# Patient Record
Sex: MALE | Race: WHITE | NOT HISPANIC OR LATINO | Employment: FULL TIME | URBAN - METROPOLITAN AREA
[De-identification: names, ages, dates, MRNs, and addresses within clinical notes are randomized per-mention and may not be internally consistent; named-entity substitution may affect disease eponyms.]

---

## 2018-10-16 ENCOUNTER — OFFICE VISIT (OUTPATIENT)
Dept: FAMILY MEDICINE CLINIC | Facility: CLINIC | Age: 28
End: 2018-10-16

## 2018-10-16 VITALS
HEART RATE: 78 BPM | HEIGHT: 73 IN | SYSTOLIC BLOOD PRESSURE: 128 MMHG | OXYGEN SATURATION: 98 % | TEMPERATURE: 97.4 F | WEIGHT: 231.4 LBS | DIASTOLIC BLOOD PRESSURE: 88 MMHG | BODY MASS INDEX: 30.67 KG/M2

## 2018-10-16 DIAGNOSIS — K51.919 ULCERATIVE COLITIS WITH COMPLICATION, UNSPECIFIED LOCATION (HCC): ICD-10-CM

## 2018-10-16 DIAGNOSIS — K21.9 GASTROESOPHAGEAL REFLUX DISEASE WITHOUT ESOPHAGITIS: ICD-10-CM

## 2018-10-16 DIAGNOSIS — Z00.00 PREVENTATIVE HEALTH CARE: Primary | ICD-10-CM

## 2018-10-16 PROCEDURE — 90715 TDAP VACCINE 7 YRS/> IM: CPT | Performed by: INTERNAL MEDICINE

## 2018-10-16 PROCEDURE — 90686 IIV4 VACC NO PRSV 0.5 ML IM: CPT | Performed by: INTERNAL MEDICINE

## 2018-10-16 PROCEDURE — 90472 IMMUNIZATION ADMIN EACH ADD: CPT | Performed by: INTERNAL MEDICINE

## 2018-10-16 PROCEDURE — 90471 IMMUNIZATION ADMIN: CPT | Performed by: INTERNAL MEDICINE

## 2018-10-16 PROCEDURE — 99395 PREV VISIT EST AGE 18-39: CPT | Performed by: INTERNAL MEDICINE

## 2018-10-16 RX ORDER — AMOXICILLIN AND CLAVULANATE POTASSIUM 875; 125 MG/1; MG/1
TABLET, FILM COATED ORAL
COMMUNITY
Start: 2018-08-31 | End: 2018-10-16

## 2018-10-16 RX ORDER — ESOMEPRAZOLE MAGNESIUM 40 MG/1
40 CAPSULE, DELAYED RELEASE ORAL
COMMUNITY
End: 2023-01-31

## 2018-10-16 RX ORDER — CETIRIZINE HYDROCHLORIDE 10 MG/1
TABLET ORAL
COMMUNITY

## 2018-10-16 RX ORDER — PREDNISONE 10 MG/1
TABLET ORAL
COMMUNITY
Start: 2018-08-31 | End: 2018-10-16

## 2018-10-16 NOTE — PROGRESS NOTES
Chief Complaint:  [facility], prevention visit    HPI:  Sheldon Interiano is a 28 y.o. male who presents today to [facility]. He reports no complaints or issues at todays visit. He has a history of ulcerative colitis and GERD. UC well controlled on current medication. He follows with GI and has c-scopes every 2 years. GERD well controlled on nexium. History of borderline high blood pressure, checks his BP at home and reports pressures usually <130/80.     ROS:  Constitutional: no fevers, night sweats or unexplained weight loss  Eyes: no vision changes  ENT: no runny nose, ear pain, sore throat  Cardio: no chest pain, palpitations  Pulm: no shortness of breath, wheezing, or cough  GI: no abdominal pain or changes in bowel movements  : no difficulty urinating  MSK: no difficulty ambulating, no joint pain  Neuro: no weakness, dizziness or headache  Psych: no trouble sleeping  Endo: no change in appetite      Past Medical History:   Diagnosis Date   • Colitis    • GERD (gastroesophageal reflux disease)    • Vitiligo       Family History   Problem Relation Age of Onset   • Hypertension Paternal Grandfather    • Cancer Paternal Grandfather    • Cancer Mother    • Stroke Maternal Grandfather       Social History     Social History   • Marital status:      Spouse name: N/A   • Number of children: N/A   • Years of education: N/A     Occupational History   • Not on file.     Social History Main Topics   • Smoking status: Never Smoker   • Smokeless tobacco: Never Used   • Alcohol use 0.6 oz/week     1 Cans of beer per week   • Drug use: No   • Sexual activity: Defer     Other Topics Concern   • Not on file     Social History Narrative   • No narrative on file      No Known Allergies     There is no immunization history on file for this patient.     PE:  Vitals:    10/16/18 1439   BP: 128/88   Pulse: 78   Temp: 97.4 °F (36.3 °C)   SpO2: 98%        Gen Appearance: NAD  HEENT: Normocephalic, PERRLA, no thyromegaly,  trache midline  Heart: RRR, normal S1 and S2, no murmur  Lungs: CTA b/l, no wheezing, no crackles  Abdomen: Soft, non-tender, non-distended, no guarding and BSx4  MSK: Moves all extremities well, normal gait, no peripheral edema  Pulses: Palpable and equal b/l  Lymph nodes: No palpable lymphadenopathy   Neuro: No focal deficits      Current Outpatient Prescriptions   Medication Sig Dispense Refill   • balsalazide (COLAZAL) 750 MG capsule Take 2,250 mg by mouth 3 (Three) Times a Day.     • cetirizine (ZYRTEC ALLERGY) 10 MG tablet Zyrtec 10 mg tablet     • esomeprazole (nexIUM) 40 MG capsule Take 40 mg by mouth Every Morning Before Breakfast.       No current facility-administered medications for this visit.         Sheldon was seen today for establish care. Counseled on nutrition and exercise for blood pressure management and overall health. <2g salt in take and 120 minutes of aerobic exercise per week. Maintain healthy weight and avoid tobacco and alcohol abuse. Will be receiving tdap and flu shot today. C-scope q 2 years per GI for screening.    Diagnoses and all orders for this visit:    Preventative health care  -     Comprehensive Metabolic Panel; Future  -     Lipid Panel; Future  -     Hemoglobin A1c; Future      Ulcerative colitis with complication, unspecified location (CMS/HCC)  Follows with GI, well controlled on balsalazide. Has apt this Thursday for f/u.    Gastroesophageal reflux disease without esophagitis    Stable, well controlled on PPI.    Return in about 1 year (around 10/16/2019).

## 2019-02-05 ENCOUNTER — OFFICE VISIT (OUTPATIENT)
Dept: FAMILY MEDICINE CLINIC | Facility: CLINIC | Age: 29
End: 2019-02-05

## 2019-02-05 VITALS
DIASTOLIC BLOOD PRESSURE: 70 MMHG | HEIGHT: 73 IN | TEMPERATURE: 97.6 F | SYSTOLIC BLOOD PRESSURE: 124 MMHG | BODY MASS INDEX: 31.28 KG/M2 | OXYGEN SATURATION: 98 % | WEIGHT: 236 LBS | HEART RATE: 101 BPM

## 2019-02-05 DIAGNOSIS — J01.00 ACUTE NON-RECURRENT MAXILLARY SINUSITIS: Primary | ICD-10-CM

## 2019-02-05 DIAGNOSIS — J02.9 SORE THROAT: ICD-10-CM

## 2019-02-05 DIAGNOSIS — R50.9 FEVER, UNSPECIFIED FEVER CAUSE: ICD-10-CM

## 2019-02-05 DIAGNOSIS — R11.2 NAUSEA AND VOMITING, INTRACTABILITY OF VOMITING NOT SPECIFIED, UNSPECIFIED VOMITING TYPE: ICD-10-CM

## 2019-02-05 LAB
EXPIRATION DATE: NORMAL
EXPIRATION DATE: NORMAL
FLUAV AG NPH QL: NEGATIVE
FLUBV AG NPH QL: NEGATIVE
INTERNAL CONTROL: NORMAL
INTERNAL CONTROL: NORMAL
Lab: NORMAL
Lab: NORMAL
S PYO AG THROAT QL: NEGATIVE

## 2019-02-05 PROCEDURE — 99214 OFFICE O/P EST MOD 30 MIN: CPT | Performed by: INTERNAL MEDICINE

## 2019-02-05 PROCEDURE — 87880 STREP A ASSAY W/OPTIC: CPT | Performed by: INTERNAL MEDICINE

## 2019-02-05 PROCEDURE — 87804 INFLUENZA ASSAY W/OPTIC: CPT | Performed by: INTERNAL MEDICINE

## 2019-02-05 RX ORDER — AMOXICILLIN 875 MG/1
875 TABLET, COATED ORAL EVERY 12 HOURS SCHEDULED
Qty: 14 TABLET | Refills: 0 | Status: SHIPPED | OUTPATIENT
Start: 2019-02-05 | End: 2019-02-12

## 2019-02-05 RX ORDER — AMOXICILLIN AND CLAVULANATE POTASSIUM 875; 125 MG/1; MG/1
1 TABLET, FILM COATED ORAL 2 TIMES DAILY
COMMUNITY
End: 2019-02-05

## 2019-02-05 NOTE — PROGRESS NOTES
Chief Complaint:  Sinus congestion, cough, diarrhea, chills    HPI:  Sheldon Interiano is a 28 y.o. male who presents today for sinus congestion, purulent nasal discharge over the last 7 days. He had some chills over the weekend but no fever. He went to  over the weekend and was prscribed augmentin for sinus infection. He reports taking some ibuprofen along with augmentin and developed lower quadrant abdominal pain, chills and diarrhea. This felt like a ulcerative colitis flare to him so he stopped the augmentin and did not take anymore ibuprofen and his symptoms resolved. He continues to have sinus pressure and congestion.    ROS:  Constitutional: no fevers, night sweats or unexplained weight loss  Eyes: no vision changes  ENT: no runny nose, ear pain, sore throat  Cardio: no chest pain, palpitations  Pulm: no shortness of breath, wheezing, or cough  GI: no abdominal pain or changes in bowel movements  : no difficulty urinating  MSK: no difficulty ambulating, no joint pain  Neuro: no weakness, dizziness or headache  Psych: no trouble sleeping  Endo: no change in appetite      Past Medical History:   Diagnosis Date   • Colitis    • GERD (gastroesophageal reflux disease)    • Vitiligo       Family History   Problem Relation Age of Onset   • Hypertension Paternal Grandfather    • Cancer Paternal Grandfather    • Cancer Mother    • Stroke Maternal Grandfather       Social History     Socioeconomic History   • Marital status:      Spouse name: Not on file   • Number of children: Not on file   • Years of education: Not on file   • Highest education level: Not on file   Social Needs   • Financial resource strain: Not on file   • Food insecurity - worry: Not on file   • Food insecurity - inability: Not on file   • Transportation needs - medical: Not on file   • Transportation needs - non-medical: Not on file   Occupational History   • Not on file   Tobacco Use   • Smoking status: Never Smoker   • Smokeless tobacco:  Never Used   Substance and Sexual Activity   • Alcohol use: Yes     Alcohol/week: 0.6 oz     Types: 1 Cans of beer per week   • Drug use: No   • Sexual activity: Defer   Other Topics Concern   • Not on file   Social History Narrative   • Not on file      No Known Allergies   Immunization History   Administered Date(s) Administered   • FLUARIX/FLUZONE/AFLURIA/FLULAVAL QUAD 10/16/2018   • Tdap 10/16/2018        PE:  Vitals:    02/05/19 0955   BP: 124/70   Pulse: 101   Temp: 97.6 °F (36.4 °C)   SpO2: 98%        Gen Appearance: NAD  HEENT: Normocephalic, PERRLA, no thyromegaly, trache midline  Heart: RRR, normal S1 and S2, no murmur  Lungs: CTA b/l, no wheezing, no crackles  Abdomen: Soft, non-tender, non-distended, no guarding and BSx4  MSK: Moves all extremities well, normal gait, no peripheral edema  Pulses: Palpable and equal b/l  Lymph nodes: No palpable lymphadenopathy   Neuro: No focal deficits      Current Outpatient Medications   Medication Sig Dispense Refill   • balsalazide (COLAZAL) 750 MG capsule Take 2,250 mg by mouth 3 (Three) Times a Day.     • cetirizine (ZYRTEC ALLERGY) 10 MG tablet Zyrtec 10 mg tablet     • esomeprazole (nexIUM) 40 MG capsule Take 40 mg by mouth Every Morning Before Breakfast.     • amoxicillin (AMOXIL) 875 MG tablet Take 1 tablet by mouth Every 12 (Twelve) Hours for 7 days. 14 tablet 0     No current facility-administered medications for this visit.         Sheldon was seen today for medication reaction.    Diagnoses and all orders for this visit:    Acute non-recurrent maxillary sinusitis  -     amoxicillin (AMOXIL) 875 MG tablet; Take 1 tablet by mouth Every 12 (Twelve) Hours for 7 days.  Strep and flu negative. Recommend waiting a few more days to see if sinusitis resolves. Likely viral. Send amoxil instead of augmentin since he does not tolerate this, although I think the NSAIDs probably played a role in upset stomach as well. Try tylenol for fever/chills instead of ibuprofen  with concurrent IBS.   Sore throat  -     POC Influenza A / B  -     POCT rapid strep A    Nausea and vomiting, intractability of vomiting not specified, unspecified vomiting type  -     POC Influenza A / B    Fever, unspecified fever cause  -     POC Influenza A / B         No Follow-up on file.

## 2019-11-01 ENCOUNTER — OFFICE VISIT (OUTPATIENT)
Dept: FAMILY MEDICINE CLINIC | Facility: CLINIC | Age: 29
End: 2019-11-01

## 2019-11-01 VITALS
HEIGHT: 73 IN | WEIGHT: 244 LBS | HEART RATE: 72 BPM | BODY MASS INDEX: 32.34 KG/M2 | DIASTOLIC BLOOD PRESSURE: 78 MMHG | OXYGEN SATURATION: 100 % | RESPIRATION RATE: 16 BRPM | SYSTOLIC BLOOD PRESSURE: 122 MMHG

## 2019-11-01 DIAGNOSIS — Z23 IMMUNIZATION DUE: ICD-10-CM

## 2019-11-01 DIAGNOSIS — Z13.6 SCREENING FOR CARDIOVASCULAR CONDITION: ICD-10-CM

## 2019-11-01 DIAGNOSIS — K21.9 GASTROESOPHAGEAL REFLUX DISEASE WITHOUT ESOPHAGITIS: ICD-10-CM

## 2019-11-01 DIAGNOSIS — Z13.220 SCREENING FOR HYPERLIPIDEMIA: ICD-10-CM

## 2019-11-01 DIAGNOSIS — K51.919 ULCERATIVE COLITIS WITH COMPLICATION, UNSPECIFIED LOCATION (HCC): ICD-10-CM

## 2019-11-01 DIAGNOSIS — Z00.00 PREVENTATIVE HEALTH CARE: Primary | ICD-10-CM

## 2019-11-01 PROCEDURE — 99395 PREV VISIT EST AGE 18-39: CPT | Performed by: INTERNAL MEDICINE

## 2019-11-01 NOTE — PROGRESS NOTES
Chief Complaint:  physical    HPI:  Sheldon Interiano is a 29 y.o. male who presents today for yearly physical.  He has history of ulcerative colitis well-controlled on radha all.  He is following with gastroenterology.  He takes Zyrtec as needed for allergic rhinitis.  Taking Nexium for GERD.  He has no other acute complaints or concerns today.    ROS:  Constitutional: no fevers, night sweats or unexplained weight loss  Eyes: no vision changes  ENT: no runny nose, ear pain, sore throat  Cardio: no chest pain, palpitations  Pulm: no shortness of breath, wheezing, or cough  GI: no abdominal pain or changes in bowel movements  : no difficulty urinating  MSK: no difficulty ambulating, no joint pain  Neuro: no weakness, dizziness or headache  Psych: no trouble sleeping  Endo: no change in appetite      Past Medical History:   Diagnosis Date   • Colitis    • GERD (gastroesophageal reflux disease)    • Vitiligo       Family History   Problem Relation Age of Onset   • Hypertension Paternal Grandfather    • Cancer Paternal Grandfather    • Cancer Mother    • Stroke Maternal Grandfather       Social History     Socioeconomic History   • Marital status:      Spouse name: Not on file   • Number of children: Not on file   • Years of education: Not on file   • Highest education level: Not on file   Tobacco Use   • Smoking status: Never Smoker   • Smokeless tobacco: Never Used   Substance and Sexual Activity   • Alcohol use: Yes     Alcohol/week: 0.6 oz     Types: 1 Cans of beer per week   • Drug use: No   • Sexual activity: Defer      No Known Allergies   Immunization History   Administered Date(s) Administered   • FLUARIX/FLUZONE/AFLURIA/FLULAVAL QUAD 10/16/2018, 10/25/2019   • Tdap 10/16/2018        PE:  Vitals:    11/01/19 1249   BP: 122/78   Pulse: 72   Resp: 16   SpO2: 100%      Body mass index is 32.2 kg/m².    Gen Appearance: NAD  HEENT: Normocephalic, PERRLA, no thyromegaly, trache midline  Heart: RRR, normal S1  and S2, no murmur  Lungs: CTA b/l, no wheezing, no crackles  Abdomen: Soft, non-tender, non-distended, no guarding and BSx4  MSK: Moves all extremities well, normal gait, no peripheral edema  Pulses: Palpable and equal b/l  Lymph nodes: No palpable lymphadenopathy   Neuro: No focal deficits      Current Outpatient Medications   Medication Sig Dispense Refill   • balsalazide (COLAZAL) 750 MG capsule Take 2,250 mg by mouth 3 (Three) Times a Day.     • cetirizine (ZYRTEC ALLERGY) 10 MG tablet Zyrtec 10 mg tablet     • esomeprazole (nexIUM) 40 MG capsule Take 40 mg by mouth Every Morning Before Breakfast.       No current facility-administered medications for this visit.         Sheldon was seen today for annual exam.    Diagnoses and all orders for this visit:    Preventative health care  Counseled on healthy weight, nutrition, physical activity, cancer screening, immunization.  Checking screening blood work today.  Ulcerative colitis with complication, unspecified location (CMS/HCC)  -     CBC & Differential; Future  -     Comprehensive Metabolic Panel; Future  -     Lipid Panel; Future    Gastroesophageal reflux disease without esophagitis  Stable on Nexium.  Continue.  Screening for hyperlipidemia  -     CBC & Differential; Future  -     Comprehensive Metabolic Panel; Future  -     Hemoglobin A1c; Future  -     Lipid Panel; Future  -     Urinalysis With Culture If Indicated - Urine, Clean Catch; Future    Screening for cardiovascular condition  -     CBC & Differential; Future  -     Comprehensive Metabolic Panel; Future  -     Hemoglobin A1c; Future  -     Lipid Panel; Future  -     Urinalysis With Culture If Indicated - Urine, Clean Catch; Future         Return in about 1 year (around 11/1/2020) for Annual.     Please note that portions of this document were completed with a voice recognition program. Efforts were made to edit the dictations, but occasionally words are mis-transcribed.

## 2020-11-24 ENCOUNTER — OFFICE VISIT (OUTPATIENT)
Dept: FAMILY MEDICINE CLINIC | Facility: CLINIC | Age: 30
End: 2020-11-24

## 2020-11-24 VITALS
HEART RATE: 76 BPM | BODY MASS INDEX: 32.47 KG/M2 | DIASTOLIC BLOOD PRESSURE: 88 MMHG | WEIGHT: 245 LBS | TEMPERATURE: 98 F | SYSTOLIC BLOOD PRESSURE: 120 MMHG | HEIGHT: 73 IN | OXYGEN SATURATION: 96 % | RESPIRATION RATE: 16 BRPM

## 2020-11-24 DIAGNOSIS — J30.2 SEASONAL ALLERGIES: ICD-10-CM

## 2020-11-24 DIAGNOSIS — E66.09 CLASS 1 OBESITY DUE TO EXCESS CALORIES WITHOUT SERIOUS COMORBIDITY WITH BODY MASS INDEX (BMI) OF 32.0 TO 32.9 IN ADULT: ICD-10-CM

## 2020-11-24 DIAGNOSIS — K51.919 ULCERATIVE COLITIS WITH COMPLICATION, UNSPECIFIED LOCATION (HCC): ICD-10-CM

## 2020-11-24 DIAGNOSIS — K21.9 GASTROESOPHAGEAL REFLUX DISEASE WITHOUT ESOPHAGITIS: ICD-10-CM

## 2020-11-24 DIAGNOSIS — Z00.00 PREVENTATIVE HEALTH CARE: Primary | ICD-10-CM

## 2020-11-24 PROBLEM — E66.811 CLASS 1 OBESITY DUE TO EXCESS CALORIES WITHOUT SERIOUS COMORBIDITY WITH BODY MASS INDEX (BMI) OF 32.0 TO 32.9 IN ADULT: Status: ACTIVE | Noted: 2020-11-24

## 2020-11-24 PROCEDURE — 99395 PREV VISIT EST AGE 18-39: CPT | Performed by: INTERNAL MEDICINE

## 2020-11-24 PROCEDURE — 90686 IIV4 VACC NO PRSV 0.5 ML IM: CPT | Performed by: INTERNAL MEDICINE

## 2020-11-24 PROCEDURE — 90471 IMMUNIZATION ADMIN: CPT | Performed by: INTERNAL MEDICINE

## 2020-11-24 NOTE — PROGRESS NOTES
Chief Complaint   Patient presents with   • Annual Exam       HPI:  Sheldon Interiano is a 30 y.o. male who presents today for annual physical.  He has no acute concerns today.  Following gastroenterology.  We will start colitis is well controlled.  Taking Zyrtec for allergies daily.  He takes Nexium for reflux.    ROS:  Constitutional: no fevers, night sweats or unexplained weight loss  Eyes: no vision changes  ENT: no runny nose, ear pain, sore throat  Cardio: no chest pain, palpitations  Pulm: no shortness of breath, wheezing, or cough  GI: no abdominal pain or changes in bowel movements  : no difficulty urinating  MSK: no difficulty ambulating, no joint pain  Neuro: no weakness, dizziness or headache  Psych: no trouble sleeping  Endo: no change in appetite      Past Medical History:   Diagnosis Date   • Colitis    • GERD (gastroesophageal reflux disease)    • Vitiligo       Family History   Problem Relation Age of Onset   • Hypertension Paternal Grandfather    • Cancer Paternal Grandfather    • Cancer Mother    • Stroke Maternal Grandfather       Social History     Socioeconomic History   • Marital status:      Spouse name: Not on file   • Number of children: Not on file   • Years of education: Not on file   • Highest education level: Not on file   Tobacco Use   • Smoking status: Never Smoker   • Smokeless tobacco: Never Used   Substance and Sexual Activity   • Alcohol use: Yes     Alcohol/week: 1.0 standard drinks     Types: 1 Cans of beer per week   • Drug use: No   • Sexual activity: Defer      No Known Allergies   Immunization History   Administered Date(s) Administered   • Flulaval/Fluarix/Fluzone Quad 10/16/2018, 10/25/2019, 11/24/2020   • Tdap 10/16/2018        PE:  Vitals:    11/24/20 1229   BP: 120/88   Pulse: 76   Resp: 16   Temp: 98 °F (36.7 °C)   SpO2: 96%      Body mass index is 32.33 kg/m².    Gen Appearance: NAD  HEENT: Normocephalic, PERRLA, no thyromegaly, trache midline  Heart: RRR,  normal S1 and S2, no murmur  Lungs: CTA b/l, no wheezing, no crackles  Abdomen: Soft, non-tender, non-distended, no guarding and BSx4  MSK: Moves all extremities well, normal gait, no peripheral edema  Pulses: Palpable and equal b/l  Lymph nodes: No palpable lymphadenopathy   Neuro: No focal deficits      Current Outpatient Medications   Medication Sig Dispense Refill   • balsalazide (COLAZAL) 750 MG capsule Take 2,250 mg by mouth 3 (Three) Times a Day.     • cetirizine (ZYRTEC ALLERGY) 10 MG tablet Zyrtec 10 mg tablet     • esomeprazole (nexIUM) 40 MG capsule Take 40 mg by mouth Every Morning Before Breakfast.       No current facility-administered medications for this visit.         Diagnoses and all orders for this visit:    1. Preventative health care (Primary)  -     FluLaval Quad >6 Months (3432-6052)  Counseled on healthy weight, nutrition, physical activity, cancer screening, and immunizations.  Counseled patient on weight loss and obesity.    2. Gastroesophageal reflux disease without esophagitis  Stable on Nexium.  Continue.  3. Ulcerative colitis with complication, unspecified location (CMS/HCC)  Followed by gastroenterology.  Symptoms stable.  Seasonal allergies  Recommend alternating antihistamine therapy.  Add on Flonase daily.    Return in about 1 year (around 11/24/2021) for Annual.     Please note that portions of this document were completed with a voice recognition program. Efforts were made to edit the dictations, but occasionally words are mis-transcribed.

## 2020-11-24 NOTE — PATIENT INSTRUCTIONS
Try alternating between claritin, zyrtec and allegra for alleriges. Take nasal spray such as flonase daily. Xyzal is a stronger antihistamine but it tends to cause dry mouth and side effects. We could try this if allergies are severe.

## 2021-01-18 ENCOUNTER — LAB (OUTPATIENT)
Dept: LAB | Facility: HOSPITAL | Age: 31
End: 2021-01-18

## 2021-01-18 DIAGNOSIS — Z00.00 PREVENTATIVE HEALTH CARE: Primary | ICD-10-CM

## 2021-01-18 DIAGNOSIS — Z00.00 PREVENTATIVE HEALTH CARE: ICD-10-CM

## 2021-01-18 DIAGNOSIS — K51.919 MILD CHRONIC ULCERATIVE COLITIS WITH COMPLICATION (HCC): Primary | ICD-10-CM

## 2021-01-18 LAB
25(OH)D3 SERPL-MCNC: 27.1 NG/ML (ref 30–100)
ALBUMIN SERPL-MCNC: 4.6 G/DL (ref 3.5–5.2)
ALBUMIN/GLOB SERPL: 1.5 G/DL
ALP SERPL-CCNC: 99 U/L (ref 39–117)
ALT SERPL W P-5'-P-CCNC: 22 U/L (ref 1–41)
ANION GAP SERPL CALCULATED.3IONS-SCNC: 8.2 MMOL/L (ref 5–15)
AST SERPL-CCNC: 24 U/L (ref 1–40)
BASOPHILS # BLD AUTO: 0.08 10*3/MM3 (ref 0–0.2)
BASOPHILS NFR BLD AUTO: 1.2 % (ref 0–1.5)
BILIRUB SERPL-MCNC: 0.4 MG/DL (ref 0–1.2)
BILIRUB UR QL STRIP: NEGATIVE
BUN SERPL-MCNC: 13 MG/DL (ref 6–20)
BUN/CREAT SERPL: 14.8 (ref 7–25)
CALCIUM SPEC-SCNC: 9.2 MG/DL (ref 8.6–10.5)
CHLORIDE SERPL-SCNC: 105 MMOL/L (ref 98–107)
CHOLEST SERPL-MCNC: 145 MG/DL (ref 0–200)
CLARITY UR: ABNORMAL
CO2 SERPL-SCNC: 29.8 MMOL/L (ref 22–29)
COLOR UR: YELLOW
CREAT SERPL-MCNC: 0.88 MG/DL (ref 0.76–1.27)
CRP SERPL-MCNC: 0.16 MG/DL (ref 0–0.5)
DEPRECATED RDW RBC AUTO: 40.4 FL (ref 37–54)
EOSINOPHIL # BLD AUTO: 0.14 10*3/MM3 (ref 0–0.4)
EOSINOPHIL NFR BLD AUTO: 2.2 % (ref 0.3–6.2)
ERYTHROCYTE [DISTWIDTH] IN BLOOD BY AUTOMATED COUNT: 12.8 % (ref 12.3–15.4)
GFR SERPL CREATININE-BSD FRML MDRD: 100 ML/MIN/1.73
GLOBULIN UR ELPH-MCNC: 3 GM/DL
GLUCOSE SERPL-MCNC: 86 MG/DL (ref 65–99)
GLUCOSE UR STRIP-MCNC: NEGATIVE MG/DL
HBA1C MFR BLD: 5.81 % (ref 4.8–5.6)
HCT VFR BLD AUTO: 47.9 % (ref 37.5–51)
HDLC SERPL-MCNC: 45 MG/DL (ref 40–60)
HGB BLD-MCNC: 16.4 G/DL (ref 13–17.7)
HGB UR QL STRIP.AUTO: NEGATIVE
IMM GRANULOCYTES # BLD AUTO: 0.02 10*3/MM3 (ref 0–0.05)
IMM GRANULOCYTES NFR BLD AUTO: 0.3 % (ref 0–0.5)
KETONES UR QL STRIP: NEGATIVE
LDLC SERPL CALC-MCNC: 90 MG/DL (ref 0–100)
LDLC/HDLC SERPL: 2.02 {RATIO}
LEUKOCYTE ESTERASE UR QL STRIP.AUTO: NEGATIVE
LYMPHOCYTES # BLD AUTO: 1.92 10*3/MM3 (ref 0.7–3.1)
LYMPHOCYTES NFR BLD AUTO: 29.9 % (ref 19.6–45.3)
MCH RBC QN AUTO: 30.1 PG (ref 26.6–33)
MCHC RBC AUTO-ENTMCNC: 34.2 G/DL (ref 31.5–35.7)
MCV RBC AUTO: 88.1 FL (ref 79–97)
MONOCYTES # BLD AUTO: 0.58 10*3/MM3 (ref 0.1–0.9)
MONOCYTES NFR BLD AUTO: 9 % (ref 5–12)
NEUTROPHILS NFR BLD AUTO: 3.68 10*3/MM3 (ref 1.7–7)
NEUTROPHILS NFR BLD AUTO: 57.4 % (ref 42.7–76)
NITRITE UR QL STRIP: NEGATIVE
NRBC BLD AUTO-RTO: 0 /100 WBC (ref 0–0.2)
PH UR STRIP.AUTO: 8.5 [PH] (ref 5–8)
PLATELET # BLD AUTO: 304 10*3/MM3 (ref 140–450)
PMV BLD AUTO: 10 FL (ref 6–12)
POTASSIUM SERPL-SCNC: 4.9 MMOL/L (ref 3.5–5.2)
PROT SERPL-MCNC: 7.6 G/DL (ref 6–8.5)
PROT UR QL STRIP: ABNORMAL
RBC # BLD AUTO: 5.44 10*6/MM3 (ref 4.14–5.8)
SODIUM SERPL-SCNC: 143 MMOL/L (ref 136–145)
SP GR UR STRIP: 1.02 (ref 1–1.03)
T4 FREE SERPL-MCNC: 1.2 NG/DL (ref 0.93–1.7)
TRIGL SERPL-MCNC: 46 MG/DL (ref 0–150)
TSH SERPL DL<=0.05 MIU/L-ACNC: 1.11 UIU/ML (ref 0.27–4.2)
UROBILINOGEN UR QL STRIP: ABNORMAL
VLDLC SERPL-MCNC: 10 MG/DL (ref 5–40)
WBC # BLD AUTO: 6.42 10*3/MM3 (ref 3.4–10.8)

## 2021-01-18 PROCEDURE — 80053 COMPREHEN METABOLIC PANEL: CPT

## 2021-01-18 PROCEDURE — 82306 VITAMIN D 25 HYDROXY: CPT

## 2021-01-18 PROCEDURE — 86140 C-REACTIVE PROTEIN: CPT

## 2021-01-18 PROCEDURE — 80061 LIPID PANEL: CPT

## 2021-01-18 PROCEDURE — 84443 ASSAY THYROID STIM HORMONE: CPT

## 2021-01-18 PROCEDURE — 83036 HEMOGLOBIN GLYCOSYLATED A1C: CPT

## 2021-01-18 PROCEDURE — 36415 COLL VENOUS BLD VENIPUNCTURE: CPT

## 2021-01-18 PROCEDURE — 85025 COMPLETE CBC W/AUTO DIFF WBC: CPT

## 2021-01-18 PROCEDURE — 84439 ASSAY OF FREE THYROXINE: CPT

## 2021-01-18 PROCEDURE — 81003 URINALYSIS AUTO W/O SCOPE: CPT

## 2021-12-10 ENCOUNTER — OFFICE VISIT (OUTPATIENT)
Dept: FAMILY MEDICINE CLINIC | Facility: CLINIC | Age: 31
End: 2021-12-10

## 2021-12-10 VITALS
HEART RATE: 75 BPM | DIASTOLIC BLOOD PRESSURE: 84 MMHG | OXYGEN SATURATION: 98 % | WEIGHT: 226 LBS | BODY MASS INDEX: 29.95 KG/M2 | SYSTOLIC BLOOD PRESSURE: 130 MMHG | HEIGHT: 73 IN

## 2021-12-10 DIAGNOSIS — R73.03 PREDIABETES: ICD-10-CM

## 2021-12-10 DIAGNOSIS — K21.9 GASTROESOPHAGEAL REFLUX DISEASE WITHOUT ESOPHAGITIS: ICD-10-CM

## 2021-12-10 DIAGNOSIS — K51.919 ULCERATIVE COLITIS WITH COMPLICATION, UNSPECIFIED LOCATION (HCC): ICD-10-CM

## 2021-12-10 DIAGNOSIS — L84 FOOT CALLUS: ICD-10-CM

## 2021-12-10 DIAGNOSIS — E55.9 VITAMIN D DEFICIENCY: ICD-10-CM

## 2021-12-10 DIAGNOSIS — Z00.00 PREVENTATIVE HEALTH CARE: Primary | ICD-10-CM

## 2021-12-10 PROCEDURE — 99395 PREV VISIT EST AGE 18-39: CPT | Performed by: INTERNAL MEDICINE

## 2021-12-10 NOTE — PROGRESS NOTES
Chief Complaint   Patient presents with   • Annual Exam   • Foot Problem     Discuss podiatry referral       HPI:  Sheldon Interiano is a 31 y.o. male who presents today for annual physical.  Is requesting a referral to podiatry to evaluate calluses.    ROS:  Constitutional: no fevers, night sweats or unexplained weight loss  Eyes: no vision changes  ENT: no runny nose, ear pain, sore throat  Cardio: no chest pain, palpitations  Pulm: no shortness of breath, wheezing, or cough  GI: no abdominal pain or changes in bowel movements  : no difficulty urinating  MSK: no difficulty ambulating, no joint pain  Neuro: no weakness, dizziness or headache  Psych: no trouble sleeping  Endo: no change in appetite      Past Medical History:   Diagnosis Date   • Colitis    • GERD (gastroesophageal reflux disease)    • Vitiligo       Family History   Problem Relation Age of Onset   • Hypertension Paternal Grandfather    • Cancer Paternal Grandfather    • Cancer Mother    • Stroke Maternal Grandfather       Social History     Socioeconomic History   • Marital status:    Tobacco Use   • Smoking status: Never Smoker   • Smokeless tobacco: Never Used   Substance and Sexual Activity   • Alcohol use: Yes     Alcohol/week: 1.0 standard drink     Types: 1 Cans of beer per week   • Drug use: No   • Sexual activity: Defer      No Known Allergies   Immunization History   Administered Date(s) Administered   • COVID-19 (PFIZER) 03/09/2021, 04/01/2021, 11/07/2021   • FluLaval/Fluarix/Fluzone >6 10/16/2018, 10/25/2019, 11/24/2020   • Flublok 18+yrs 11/06/2021   • Influenza, Unspecified 09/19/2018   • Tdap 10/16/2018        PE:  Vitals:    12/10/21 1310   BP: 130/84   Pulse: 75   SpO2: 98%      Body mass index is 29.83 kg/m².    Gen Appearance: NAD  HEENT: Normocephalic, PERRLA, no thyromegaly, trache midline  Heart: RRR, normal S1 and S2, no murmur  Lungs: CTA b/l, no wheezing, no crackles  Abdomen: Soft, non-tender, non-distended, no  guarding and BSx4  MSK: Moves all extremities well, normal gait, no peripheral edema  Pulses: Palpable and equal b/l  Lymph nodes: No palpable lymphadenopathy   Neuro: No focal deficits      Current Outpatient Medications   Medication Sig Dispense Refill   • balsalazide (COLAZAL) 750 MG capsule Take 2,250 mg by mouth 3 (Three) Times a Day.     • cetirizine (ZYRTEC ALLERGY) 10 MG tablet Zyrtec 10 mg tablet     • esomeprazole (nexIUM) 40 MG capsule Take 40 mg by mouth Every Morning Before Breakfast.       No current facility-administered medications for this visit.        Diagnoses and all orders for this visit:    1. Preventative health care (Primary)  -     CBC & Differential; Future  -     Comprehensive Metabolic Panel; Future  -     Hemoglobin A1c; Future  -     Lipid Panel; Future  -     TSH+Free T4; Future  -     Urinalysis With Culture If Indicated - Urine, Clean Catch; Future  Counseled on healthy weight, nutrition, physical activity, cancer screening, and immunizations.    2. Foot callus  -     Ambulatory Referral to Podiatry    3. Prediabetes  Counseled on lifestyle changes including diet and exercise and weight loss.  4. Ulcerative colitis with complication, unspecified location (HCC)    5. Gastroesophageal reflux disease without esophagitis    6. Vitamin D deficiency  -     Vitamin D 25 Hydroxy; Future         No follow-ups on file.     Dictated Utilizing Dragon Dictation    Please note that portions of this note were completed with a voice recognition program.    Part of this note may be an electronic transcription/translation of spoken language to printed text using the Dragon Dictation System.

## 2022-06-09 ENCOUNTER — OFFICE VISIT (OUTPATIENT)
Dept: FAMILY MEDICINE CLINIC | Facility: CLINIC | Age: 32
End: 2022-06-09

## 2022-06-09 VITALS
SYSTOLIC BLOOD PRESSURE: 142 MMHG | DIASTOLIC BLOOD PRESSURE: 78 MMHG | BODY MASS INDEX: 29.58 KG/M2 | OXYGEN SATURATION: 97 % | HEIGHT: 73 IN | HEART RATE: 85 BPM | WEIGHT: 223.2 LBS

## 2022-06-09 DIAGNOSIS — J01.10 ACUTE NON-RECURRENT FRONTAL SINUSITIS: Primary | ICD-10-CM

## 2022-06-09 PROBLEM — I10 ESSENTIAL HYPERTENSION: Status: ACTIVE | Noted: 2022-06-09

## 2022-06-09 PROCEDURE — 99213 OFFICE O/P EST LOW 20 MIN: CPT | Performed by: INTERNAL MEDICINE

## 2022-06-09 RX ORDER — GUAIFENESIN, PSEUDOEPHEDRINE HYDROCHLORIDE 600; 60 MG/1; MG/1
1 TABLET, EXTENDED RELEASE ORAL EVERY 12 HOURS
Qty: 28 TABLET | Refills: 0 | Status: SHIPPED | OUTPATIENT
Start: 2022-06-09 | End: 2023-01-31

## 2022-06-09 RX ORDER — AZITHROMYCIN 250 MG/1
TABLET, FILM COATED ORAL
Qty: 6 TABLET | Refills: 0 | Status: SHIPPED | OUTPATIENT
Start: 2022-06-09 | End: 2023-01-31

## 2022-06-09 RX ORDER — FLUTICASONE PROPIONATE 50 MCG
2 SPRAY, SUSPENSION (ML) NASAL DAILY
Qty: 5 G | Refills: 0 | Status: SHIPPED | OUTPATIENT
Start: 2022-06-09

## 2022-06-09 NOTE — PROGRESS NOTES
"Sheldon Interiano  1990  [id number]  Patient Care Team:  Jose J Tello DO as PCP - General (Internal Medicine)    Sheldon Interiano is a 32 y.o. male here today for follow up.     This patient is accompanied by their self who contributes to the history of their care.    Chief Complaint:    Chief Complaint   Patient presents with   • Nasal Congestion     Sx started Sunday. Pt states got home from traveling last night and sinus pain was worse. Pt states took 2 rapid Covid tests and came back negative. Pt states took his PCR Covid test today and is currently waiting on results. Pt states no fever.     • Chills        History of Present Illness:  I have reviewed and/or updated the patient's past medical, past surgical, family, social history, problem list and allergies as appropriate.      Recent flight from [place], developed UR sx runny nose. Acute onset of sinus pressure. No cough. There is ear discomfort. Has not trried OTC medications. Using saline spray. Subjective fever and significant chills/myalgias.  There have been no wheezing.  Cough is occasionally productive of sputum.  Denies any chest pain.  He tells me has had difficulty with antibiotics in the past which was flared to his ulcerative colitis.  No current diarrhea.  No nausea or vomiting.    Review of Systems   Constitutional: Positive for fatigue and fever.   HENT: Positive for sinus pressure.    Eyes: Negative for photophobia.   Respiratory: Negative.    Cardiovascular: Negative.    Gastrointestinal: Negative.    Neurological: Positive for headache.       Vitals:    06/09/22 1555   BP: 142/78   Pulse: 85   SpO2: 97%   Weight: 101 kg (223 lb 3.2 oz)   Height: 185.4 cm (72.99\")   PainSc: 0-No pain     Body mass index is 29.45 kg/m².    Physical Exam  Vitals and nursing note reviewed.   Constitutional:       General: He is not in acute distress.     Appearance: He is well-developed. He is not diaphoretic.   HENT:      Head: Normocephalic and " atraumatic.      Right Ear: External ear normal.      Left Ear: External ear normal.      Mouth/Throat:      Pharynx: No oropharyngeal exudate.      Comments: Thick cloudy postnasal drainage.  Eyes:      General: No scleral icterus.        Right eye: No discharge.         Left eye: No discharge.      Conjunctiva/sclera: Conjunctivae normal.   Neck:      Thyroid: No thyromegaly.      Vascular: No JVD.      Trachea: No tracheal deviation.   Cardiovascular:      Rate and Rhythm: Normal rate and regular rhythm.      Heart sounds: Normal heart sounds.      Comments: PMI nondisplaced  Pulmonary:      Effort: Pulmonary effort is normal.      Breath sounds: Normal breath sounds. No wheezing or rales.   Abdominal:      General: Bowel sounds are normal.      Palpations: Abdomen is soft.      Tenderness: There is no abdominal tenderness. There is no guarding or rebound.   Musculoskeletal:      Cervical back: Normal range of motion and neck supple.      Comments: Normal gait   Lymphadenopathy:      Cervical: No cervical adenopathy.   Skin:     General: Skin is warm and dry.      Capillary Refill: Capillary refill takes less than 2 seconds.      Coloration: Skin is not pale.      Findings: No rash.   Neurological:      Mental Status: He is alert and oriented to person, place, and time.      Motor: No abnormal muscle tone.      Coordination: Coordination normal.   Psychiatric:         Judgment: Judgment normal.         Procedures    Results Review:    None    Assessment/Plan:     Problem List Items Addressed This Visit    None     Visit Diagnoses     Acute non-recurrent frontal sinusitis    -  Primary    [facility] pot, Mucinex D and Flonase. provided Rx  for azithromycin.  he Will  hold   this to see if symptoms progress or improve. He is to discuss abx with his GI      Have recommended KEfir 1/2 cup daily while on antibiotics if he chooses to initiate these    Plan of care reviewed with patient at the conclusion of today's visit.  Education was provided regarding diagnosis and management.  Patient verbalizes understanding of and agreement with management plan.    Return for Next scheduled follow up.    Clem Olivares MD      Please note than portions of this note were completed wt a Voice Recognition Program

## 2022-06-12 ENCOUNTER — TELEPHONE (OUTPATIENT)
Dept: FAMILY MEDICINE CLINIC | Facility: CLINIC | Age: 32
End: 2022-06-12

## 2022-06-12 NOTE — TELEPHONE ENCOUNTER
Received after hours message reporting patient tested positive for covid and has questions/concerns. Returned call but patient did not answer - left voicemail.

## 2023-01-31 ENCOUNTER — OFFICE VISIT (OUTPATIENT)
Dept: FAMILY MEDICINE CLINIC | Facility: CLINIC | Age: 33
End: 2023-01-31
Payer: COMMERCIAL

## 2023-01-31 VITALS
WEIGHT: 218 LBS | DIASTOLIC BLOOD PRESSURE: 94 MMHG | BODY MASS INDEX: 28.89 KG/M2 | HEART RATE: 86 BPM | SYSTOLIC BLOOD PRESSURE: 124 MMHG | OXYGEN SATURATION: 98 % | HEIGHT: 73 IN

## 2023-01-31 DIAGNOSIS — R73.03 PREDIABETES: ICD-10-CM

## 2023-01-31 DIAGNOSIS — K51.919 ULCERATIVE COLITIS WITH COMPLICATION, UNSPECIFIED LOCATION: ICD-10-CM

## 2023-01-31 DIAGNOSIS — Z11.59 ENCOUNTER FOR HEPATITIS C SCREENING TEST FOR LOW RISK PATIENT: ICD-10-CM

## 2023-01-31 DIAGNOSIS — R03.0 ELEVATED BLOOD PRESSURE READING: ICD-10-CM

## 2023-01-31 DIAGNOSIS — E55.9 VITAMIN D DEFICIENCY: ICD-10-CM

## 2023-01-31 DIAGNOSIS — Z00.00 PREVENTATIVE HEALTH CARE: Primary | ICD-10-CM

## 2023-01-31 PROCEDURE — 99395 PREV VISIT EST AGE 18-39: CPT | Performed by: INTERNAL MEDICINE

## 2023-01-31 NOTE — PROGRESS NOTES
Chief Complaint   Patient presents with   • Annual Exam   • Hypertension       F/u        HPI:  Sheldon Interiano is a 32 y.o. male who presents today for annual exam.    ROS:  Constitutional: no fevers, night sweats or unexplained weight loss  Eyes: no vision changes  ENT: no runny nose, ear pain, sore throat  Cardio: no chest pain, palpitations  Pulm: no shortness of breath, wheezing, or cough  GI: no abdominal pain or changes in bowel movements  : no difficulty urinating  MSK: no difficulty ambulating, no joint pain  Neuro: no weakness, dizziness or headache  Psych: no trouble sleeping  Endo: no change in appetite      Past Medical History:   Diagnosis Date   • Allergic     Seasonal   • Colitis    • GERD (gastroesophageal reflux disease)    • Inflammatory bowel disease     Have gastro doctor I see. Inactive currently   • Vitiligo       Family History   Problem Relation Age of Onset   • Hypertension Paternal Grandfather    • Cancer Paternal Grandfather          from colon cancer    • Cancer Mother    • Stroke Maternal Grandfather       Social History     Socioeconomic History   • Marital status:    Tobacco Use   • Smoking status: Never   • Smokeless tobacco: Never   Substance and Sexual Activity   • Alcohol use: Yes     Alcohol/week: 1.0 standard drink     Types: 1 Cans of beer per week   • Drug use: No   • Sexual activity: Yes     Partners: Female      No Known Allergies   Immunization History   Administered Date(s) Administered   • COVID-19 (PFIZER) BIVALENT BOOSTER 12+YRS 2022   • COVID-19 (PFIZER) PURPLE CAP 2021, 2021, 2021   • Flu Vaccine Quad PF 6-35MO 2022   • FluLaval/Fluzone >6mos 10/16/2018, 10/25/2019, 2020   • Flublok 18+yrs 2021   • Influenza, Unspecified 2018   • Tdap 10/16/2018        PE:  Vitals:    23 1235   BP: 124/94   Pulse: 86   SpO2: 98%      Body mass index is 28.77 kg/m².    Gen Appearance: NAD  HEENT: Normocephalic,  PERRLA, no thyromegaly, trache midline  Heart: RRR, normal S1 and S2, no murmur  Lungs: CTA b/l, no wheezing, no crackles  Abdomen: Soft, non-tender, non-distended, no guarding and BSx4  MSK: Moves all extremities well, normal gait, no peripheral edema  Pulses: Palpable and equal b/l  Lymph nodes: No palpable lymphadenopathy   Neuro: No focal deficits      Current Outpatient Medications   Medication Sig Dispense Refill   • balsalazide (COLAZAL) 750 MG capsule Take 2,250 mg by mouth 3 (Three) Times a Day.     • cetirizine (zyrTEC) 10 MG tablet Zyrtec 10 mg tablet     • fluticasone (Flonase) 50 MCG/ACT nasal spray 2 sprays into the nostril(s) as directed by provider Daily. 5 g 0     No current facility-administered medications for this visit.        Diagnoses and all orders for this visit:    1. Preventative health care (Primary)  -     CBC & Differential; Future  -     Comprehensive Metabolic Panel; Future  -     Hemoglobin A1c; Future  -     Lipid Panel; Future  -     TSH+Free T4; Future  -     Urinalysis With Culture If Indicated - Urine, Clean Catch; Future  Counseled on healthy weight, nutrition, physical activity, cancer screening, and immunizations.    2. Vitamin D deficiency  -     Vitamin D,25-Hydroxy; Future    3. Prediabetes    4. Ulcerative colitis with complication, unspecified location (HCC)    5. Elevated blood pressure reading    6. Encounter for hepatitis C screening test for low risk patient  -     Hepatitis C Antibody; Future         Return in about 1 year (around 1/31/2024) for Annual.     Dictated Utilizing Dragon Dictation    Please note that portions of this note were completed with a voice recognition program.    Part of this note may be an electronic transcription/translation of spoken language to printed text using the Dragon Dictation System.

## 2023-02-23 ENCOUNTER — LAB (OUTPATIENT)
Dept: LAB | Facility: HOSPITAL | Age: 33
End: 2023-02-23
Payer: COMMERCIAL

## 2023-02-23 DIAGNOSIS — E55.9 VITAMIN D DEFICIENCY: ICD-10-CM

## 2023-02-23 DIAGNOSIS — Z11.59 ENCOUNTER FOR HEPATITIS C SCREENING TEST FOR LOW RISK PATIENT: ICD-10-CM

## 2023-02-23 DIAGNOSIS — Z00.00 PREVENTATIVE HEALTH CARE: ICD-10-CM

## 2023-02-23 LAB
25(OH)D3 SERPL-MCNC: 25.1 NG/ML (ref 30–100)
ALBUMIN SERPL-MCNC: 4.3 G/DL (ref 3.5–5.2)
ALBUMIN/GLOB SERPL: 1.7 G/DL
ALP SERPL-CCNC: 73 U/L (ref 39–117)
ALT SERPL W P-5'-P-CCNC: 16 U/L (ref 1–41)
ANION GAP SERPL CALCULATED.3IONS-SCNC: 6 MMOL/L (ref 5–15)
AST SERPL-CCNC: 27 U/L (ref 1–40)
BASOPHILS # BLD AUTO: 0.08 10*3/MM3 (ref 0–0.2)
BASOPHILS NFR BLD AUTO: 1.5 % (ref 0–1.5)
BILIRUB SERPL-MCNC: 0.5 MG/DL (ref 0–1.2)
BILIRUB UR QL STRIP: NEGATIVE
BUN SERPL-MCNC: 15 MG/DL (ref 6–20)
BUN/CREAT SERPL: 16.5 (ref 7–25)
CALCIUM SPEC-SCNC: 9.4 MG/DL (ref 8.6–10.5)
CHLORIDE SERPL-SCNC: 103 MMOL/L (ref 98–107)
CHOLEST SERPL-MCNC: 136 MG/DL (ref 0–200)
CLARITY UR: CLEAR
CO2 SERPL-SCNC: 28 MMOL/L (ref 22–29)
COLOR UR: YELLOW
CREAT SERPL-MCNC: 0.91 MG/DL (ref 0.76–1.27)
DEPRECATED RDW RBC AUTO: 40.8 FL (ref 37–54)
EGFRCR SERPLBLD CKD-EPI 2021: 113.4 ML/MIN/1.73
EOSINOPHIL # BLD AUTO: 0.1 10*3/MM3 (ref 0–0.4)
EOSINOPHIL NFR BLD AUTO: 1.9 % (ref 0.3–6.2)
ERYTHROCYTE [DISTWIDTH] IN BLOOD BY AUTOMATED COUNT: 12.5 % (ref 12.3–15.4)
GLOBULIN UR ELPH-MCNC: 2.5 GM/DL
GLUCOSE SERPL-MCNC: 82 MG/DL (ref 65–99)
GLUCOSE UR STRIP-MCNC: NEGATIVE MG/DL
HBA1C MFR BLD: 5.6 % (ref 4.8–5.6)
HCT VFR BLD AUTO: 44.1 % (ref 37.5–51)
HCV AB SER DONR QL: NORMAL
HDLC SERPL-MCNC: 46 MG/DL (ref 40–60)
HGB BLD-MCNC: 15 G/DL (ref 13–17.7)
HGB UR QL STRIP.AUTO: NEGATIVE
IMM GRANULOCYTES # BLD AUTO: 0.01 10*3/MM3 (ref 0–0.05)
IMM GRANULOCYTES NFR BLD AUTO: 0.2 % (ref 0–0.5)
KETONES UR QL STRIP: ABNORMAL
LDLC SERPL CALC-MCNC: 82 MG/DL (ref 0–100)
LDLC/HDLC SERPL: 1.82 {RATIO}
LEUKOCYTE ESTERASE UR QL STRIP.AUTO: NEGATIVE
LYMPHOCYTES # BLD AUTO: 1.67 10*3/MM3 (ref 0.7–3.1)
LYMPHOCYTES NFR BLD AUTO: 31.2 % (ref 19.6–45.3)
MCH RBC QN AUTO: 30.4 PG (ref 26.6–33)
MCHC RBC AUTO-ENTMCNC: 34 G/DL (ref 31.5–35.7)
MCV RBC AUTO: 89.3 FL (ref 79–97)
MONOCYTES # BLD AUTO: 0.45 10*3/MM3 (ref 0.1–0.9)
MONOCYTES NFR BLD AUTO: 8.4 % (ref 5–12)
NEUTROPHILS NFR BLD AUTO: 3.05 10*3/MM3 (ref 1.7–7)
NEUTROPHILS NFR BLD AUTO: 56.8 % (ref 42.7–76)
NITRITE UR QL STRIP: NEGATIVE
NRBC BLD AUTO-RTO: 0 /100 WBC (ref 0–0.2)
PH UR STRIP.AUTO: 6.5 [PH] (ref 5–8)
PLATELET # BLD AUTO: 243 10*3/MM3 (ref 140–450)
PMV BLD AUTO: 10 FL (ref 6–12)
POTASSIUM SERPL-SCNC: 4.5 MMOL/L (ref 3.5–5.2)
PROT SERPL-MCNC: 6.8 G/DL (ref 6–8.5)
PROT UR QL STRIP: NEGATIVE
RBC # BLD AUTO: 4.94 10*6/MM3 (ref 4.14–5.8)
SODIUM SERPL-SCNC: 137 MMOL/L (ref 136–145)
SP GR UR STRIP: 1.02 (ref 1–1.03)
T4 FREE SERPL-MCNC: 1.32 NG/DL (ref 0.93–1.7)
TRIGL SERPL-MCNC: 31 MG/DL (ref 0–150)
TSH SERPL DL<=0.05 MIU/L-ACNC: 1.16 UIU/ML (ref 0.27–4.2)
UROBILINOGEN UR QL STRIP: ABNORMAL
VLDLC SERPL-MCNC: 8 MG/DL (ref 5–40)
WBC NRBC COR # BLD: 5.36 10*3/MM3 (ref 3.4–10.8)

## 2023-02-23 PROCEDURE — 82306 VITAMIN D 25 HYDROXY: CPT

## 2023-02-23 PROCEDURE — 80053 COMPREHEN METABOLIC PANEL: CPT

## 2023-02-23 PROCEDURE — 84443 ASSAY THYROID STIM HORMONE: CPT

## 2023-02-23 PROCEDURE — 80061 LIPID PANEL: CPT

## 2023-02-23 PROCEDURE — 81003 URINALYSIS AUTO W/O SCOPE: CPT

## 2023-02-23 PROCEDURE — 84439 ASSAY OF FREE THYROXINE: CPT

## 2023-02-23 PROCEDURE — 85025 COMPLETE CBC W/AUTO DIFF WBC: CPT

## 2023-02-23 PROCEDURE — 83036 HEMOGLOBIN GLYCOSYLATED A1C: CPT

## 2023-02-23 PROCEDURE — 86803 HEPATITIS C AB TEST: CPT

## 2023-03-20 ENCOUNTER — TELEPHONE (OUTPATIENT)
Dept: FAMILY MEDICINE CLINIC | Facility: CLINIC | Age: 33
End: 2023-03-20

## 2023-03-20 ENCOUNTER — OFFICE VISIT (OUTPATIENT)
Dept: FAMILY MEDICINE CLINIC | Facility: CLINIC | Age: 33
End: 2023-03-20
Payer: COMMERCIAL

## 2023-03-20 VITALS
DIASTOLIC BLOOD PRESSURE: 76 MMHG | WEIGHT: 218 LBS | HEIGHT: 73 IN | HEART RATE: 94 BPM | BODY MASS INDEX: 28.89 KG/M2 | SYSTOLIC BLOOD PRESSURE: 128 MMHG | OXYGEN SATURATION: 98 %

## 2023-03-20 DIAGNOSIS — N41.9 PROSTATITIS, UNSPECIFIED PROSTATITIS TYPE: Primary | ICD-10-CM

## 2023-03-20 DIAGNOSIS — R35.0 URINARY FREQUENCY: ICD-10-CM

## 2023-03-20 DIAGNOSIS — N41.9 PROSTATITIS, UNSPECIFIED PROSTATITIS TYPE: ICD-10-CM

## 2023-03-20 LAB
BILIRUB BLD-MCNC: NEGATIVE MG/DL
CLARITY, POC: ABNORMAL
COLOR UR: YELLOW
EXPIRATION DATE: ABNORMAL
GLUCOSE UR STRIP-MCNC: NEGATIVE MG/DL
KETONES UR QL: NEGATIVE
LEUKOCYTE EST, POC: NEGATIVE
Lab: ABNORMAL
NITRITE UR-MCNC: NEGATIVE MG/ML
PH UR: 6 [PH] (ref 5–8)
PROT UR STRIP-MCNC: ABNORMAL MG/DL
RBC # UR STRIP: NEGATIVE /UL
SP GR UR: 1.01 (ref 1–1.03)
UROBILINOGEN UR QL: NORMAL

## 2023-03-20 PROCEDURE — 81003 URINALYSIS AUTO W/O SCOPE: CPT | Performed by: INTERNAL MEDICINE

## 2023-03-20 PROCEDURE — 99214 OFFICE O/P EST MOD 30 MIN: CPT | Performed by: INTERNAL MEDICINE

## 2023-03-20 PROCEDURE — 87086 URINE CULTURE/COLONY COUNT: CPT | Performed by: INTERNAL MEDICINE

## 2023-03-20 RX ORDER — CIPROFLOXACIN 500 MG/1
500 TABLET, FILM COATED ORAL 2 TIMES DAILY
Qty: 20 TABLET | Refills: 0 | Status: SHIPPED | OUTPATIENT
Start: 2023-03-20 | End: 2023-03-30

## 2023-03-20 NOTE — PROGRESS NOTES
"Chief Complaint   Patient presents with   • Difficulty Urinating     Feeling \"fullness' in prostate with occasional pain. Urinary frequency x a few weeks, on and off.        HPI:  Sheldon Interiano is a 32 y.o. male who presents today for prostate pain and increased urinary frequency over the past few weeks.    ROS:  Constitutional: no fevers, night sweats or unexplained weight loss  Eyes: no vision changes  ENT: no runny nose, ear pain, sore throat  Cardio: no chest pain, palpitations  Pulm: no shortness of breath, wheezing, or cough  GI: no abdominal pain or changes in bowel movements  : no difficulty urinating  MSK: no difficulty ambulating, no joint pain  Neuro: no weakness, dizziness or headache  Psych: no trouble sleeping  Endo: no change in appetite      Past Medical History:   Diagnosis Date   • Allergic     Seasonal   • Colitis    • GERD (gastroesophageal reflux disease)    • Inflammatory bowel disease     Have gastro doctor I see. Inactive currently   • Vitiligo       Family History   Problem Relation Age of Onset   • Hypertension Paternal Grandfather    • Cancer Paternal Grandfather          from colon cancer    • Cancer Mother    • Stroke Maternal Grandfather       Social History     Socioeconomic History   • Marital status:    Tobacco Use   • Smoking status: Never   • Smokeless tobacco: Never   Substance and Sexual Activity   • Alcohol use: Yes     Alcohol/week: 1.0 standard drink     Types: 1 Cans of beer per week   • Drug use: No   • Sexual activity: Yes     Partners: Female      No Known Allergies   Immunization History   Administered Date(s) Administered   • COVID-19 (PFIZER) BIVALENT BOOSTER 12+YRS 2022   • COVID-19 (PFIZER) PURPLE CAP 2021, 2021, 2021   • Flu Vaccine Quad PF 6-35MO 2022   • FluLaval/Fluzone >6mos 10/16/2018, 10/25/2019, 2020   • Flublok 18+yrs 2021   • Influenza, Unspecified 2018   • Tdap 10/16/2018    "     PE:  Vitals:    03/20/23 0915   BP: 128/76   Pulse: 94   SpO2: 98%      Body mass index is 28.77 kg/m².    Gen Appearance: NAD  HEENT: Normocephalic, PERRLA, no thyromegaly, trache midline  Heart: RRR, normal S1 and S2, no murmur  Lungs: CTA b/l, no wheezing, no crackles  Abdomen: Soft, non-tender, non-distended, no guarding and BSx4  MSK: Moves all extremities well, normal gait, no peripheral edema  Pulses: Palpable and equal b/l  Lymph nodes: No palpable lymphadenopathy   Neuro: No focal deficits      Current Outpatient Medications   Medication Sig Dispense Refill   • balsalazide (COLAZAL) 750 MG capsule Take 3 capsules by mouth 3 (Three) Times a Day.     • cetirizine (zyrTEC) 10 MG tablet Zyrtec 10 mg tablet     • fluticasone (Flonase) 50 MCG/ACT nasal spray 2 sprays into the nostril(s) as directed by provider Daily. 5 g 0   • ciprofloxacin (Cipro) 500 MG tablet Take 1 tablet by mouth 2 (Two) Times a Day for 10 days. 20 tablet 0     No current facility-administered medications for this visit.      Sending urine for culture.  Recommend antibiotics for prostatitis and establish care with urology.    Diagnoses and all orders for this visit:    1. Prostatitis, unspecified prostatitis type (Primary)  -     ciprofloxacin (Cipro) 500 MG tablet; Take 1 tablet by mouth 2 (Two) Times a Day for 10 days.  Dispense: 20 tablet; Refill: 0  -     Ambulatory Referral to Urology  -     Urine Culture - Urine, Urine, Clean Catch; Future    2. Urinary frequency  -     POCT urinalysis dipstick, automated  -     ciprofloxacin (Cipro) 500 MG tablet; Take 1 tablet by mouth 2 (Two) Times a Day for 10 days.  Dispense: 20 tablet; Refill: 0  -     Ambulatory Referral to Urology  -     Urine Culture - Urine, Urine, Clean Catch; Future         No follow-ups on file.     Dictated Utilizing Dragon Dictation    Please note that portions of this note were completed with a voice recognition program.    Part of this note may be an electronic  transcription/translation of spoken language to printed text using the Dragon Dictation System.

## 2023-03-20 NOTE — TELEPHONE ENCOUNTER
Caller: Sheldon Interiano    Relationship: Self    Best call back number: [phone]    What medications are you currently taking:   Current Outpatient Medications on File Prior to Visit   Medication Sig Dispense Refill   • balsalazide (COLAZAL) 750 MG capsule Take 3 capsules by mouth 3 (Three) Times a Day.     • cetirizine (zyrTEC) 10 MG tablet Zyrtec 10 mg tablet     • ciprofloxacin (Cipro) 500 MG tablet Take 1 tablet by mouth 2 (Two) Times a Day for 10 days. 20 tablet 0   • fluticasone (Flonase) 50 MCG/ACT nasal spray 2 sprays into the nostril(s) as directed by provider Daily. 5 g 0     No current facility-administered medications on file prior to visit.          Which medication are you concerned about: ciprofloxacin (Cipro) 500 MG tablet- PRESCRIBED TODAY      What are your concerns: PATIENT WANTS TO KNOW IF THIS MEDICATION CAN TRIGGER HIS ULCERATIVE COLITIS. REQUEST CALLBACK

## 2023-03-21 LAB — BACTERIA SPEC AEROBE CULT: NO GROWTH

## 2023-04-10 ENCOUNTER — HOSPITAL ENCOUNTER (OUTPATIENT)
Dept: ULTRASOUND IMAGING | Facility: HOSPITAL | Age: 33
Discharge: HOME | End: 2023-04-10
Admitting: INTERNAL MEDICINE
Payer: COMMERCIAL

## 2023-04-10 DIAGNOSIS — N41.9 PROSTATITIS, UNSPECIFIED PROSTATITIS TYPE: ICD-10-CM

## 2023-04-10 PROCEDURE — 76870 US EXAM SCROTUM: CPT

## 2023-04-19 ENCOUNTER — OFFICE VISIT (OUTPATIENT)
Dept: UROLOGY | Facility: CLINIC | Age: 33
End: 2023-04-19
Payer: COMMERCIAL

## 2023-04-19 VITALS — BODY MASS INDEX: 28.77 KG/M2 | HEIGHT: 73 IN

## 2023-04-19 DIAGNOSIS — N41.1 CHRONIC PROSTATITIS: ICD-10-CM

## 2023-04-19 DIAGNOSIS — R30.0 DYSURIA: ICD-10-CM

## 2023-04-19 DIAGNOSIS — N50.811 PAIN IN BOTH TESTICLES: ICD-10-CM

## 2023-04-19 DIAGNOSIS — R35.0 URINARY FREQUENCY: Primary | ICD-10-CM

## 2023-04-19 DIAGNOSIS — N50.812 PAIN IN BOTH TESTICLES: ICD-10-CM

## 2023-04-19 LAB
BILIRUB BLD-MCNC: NEGATIVE MG/DL
CLARITY, POC: CLEAR
COLOR UR: YELLOW
EXPIRATION DATE: ABNORMAL
GLUCOSE UR STRIP-MCNC: NEGATIVE MG/DL
KETONES UR QL: NEGATIVE
LEUKOCYTE EST, POC: NEGATIVE
Lab: ABNORMAL
NITRITE UR-MCNC: NEGATIVE MG/ML
PH UR: 8.5 [PH] (ref 5–8)
PROT UR STRIP-MCNC: NEGATIVE MG/DL
RBC # UR STRIP: NEGATIVE /UL
SP GR UR: 1.01 (ref 1–1.03)
UROBILINOGEN UR QL: NORMAL

## 2023-04-19 PROCEDURE — 87109 MYCOPLASMA: CPT | Performed by: STUDENT IN AN ORGANIZED HEALTH CARE EDUCATION/TRAINING PROGRAM

## 2023-04-19 RX ORDER — TOLTERODINE 2 MG/1
2 CAPSULE, EXTENDED RELEASE ORAL DAILY
Qty: 30 CAPSULE | Refills: 0 | Status: SHIPPED | OUTPATIENT
Start: 2023-04-19 | End: 2023-04-19

## 2023-04-19 RX ORDER — DOXYCYCLINE HYCLATE 100 MG/1
100 CAPSULE ORAL 2 TIMES DAILY
Qty: 40 CAPSULE | Refills: 0 | Status: SHIPPED | OUTPATIENT
Start: 2023-04-19 | End: 2023-05-09

## 2023-04-19 RX ORDER — TOLTERODINE 2 MG/1
2 CAPSULE, EXTENDED RELEASE ORAL DAILY
Qty: 30 CAPSULE | Refills: 1 | Status: SHIPPED | OUTPATIENT
Start: 2023-04-19

## 2023-04-19 NOTE — PROGRESS NOTES
"     [facility] Male Office Visit      Patient Name: Sheldon Interiano  : 1990   MRN: [id number]     Chief Complaint:  Lower Urinary Tract Symptoms.   Chief Complaint   Patient presents with   • Urinary Frequency   • Prostatitis        Referring Provider: Jose J Tello, *    History of Present Illness: Mr. Interiano is a 33 y.o. male with history of lower urinary tract symptoms. He has a history of ulcerative colitis. He is  with 1 children, no prior vasectomy. Wife had a hysterectomy.     Reports a sensation of incomplete bladder emptying, bilateral testicular pain.     Reports he woke up in \"middle of the night\" with pain \"beneath\" his testicles within the last month.      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 Denies constipation other than intermittently associated with UC. He has never had prior surgery for this.     Thinks when he's constipated his symptoms are worse when he's been constipated.     Scrotal US 4/10/23  IMPRESSION:  Impression:     1. Bilateral varicoceles.  2. Small bilateral hydroceles.       Subjective      Review of System: Review of Systems   Genitourinary: Negative for decreased urine volume, difficulty urinating, dysuria, enuresis, flank pain, frequency, hematuria and urgency.      I have reviewed the ROS documented by my clinical staff, I have updated appropriately and I agree. Joey Barba MD    Past Medical History:  Past Medical History:   Diagnosis Date   • Allergic     Seasonal   • Colitis    • GERD (gastroesophageal reflux disease)    • Inflammatory bowel disease     Have gastro doctor I see. Inactive currently   • Vitiligo        Past Surgical History:  Past Surgical History:   Procedure Laterality Date   • EYE SURGERY         Medications:    Current Outpatient Medications:   •  balsalazide (COLAZAL) 750 MG capsule, Take 3 capsules by mouth 3 (Three) " "Times a Day., Disp: , Rfl:   •  cetirizine (zyrTEC) 10 MG tablet, Zyrtec 10 mg tablet, Disp: , Rfl:   •  fluticasone (Flonase) 50 MCG/ACT nasal spray, 2 sprays into the nostril(s) as directed by provider Daily., Disp: 5 g, Rfl: 0  •  tolterodine LA (DETROL LA) 2 MG 24 hr capsule, Take 1 capsule by mouth Daily., Disp: 30 capsule, Rfl: 1  •  doxycycline (VIBRAMYCIN) 100 MG capsule, Take 1 capsule by mouth 2 (Two) Times a Day for 20 days., Disp: 40 capsule, Rfl: 0    Allergies:  No Known Allergies    Social History:  Social History     Socioeconomic History   • Marital status:    Tobacco Use   • Smoking status: Never   • Smokeless tobacco: Never   Vaping Use   • Vaping Use: Never used   Substance and Sexual Activity   • Alcohol use: Yes     Alcohol/week: 1.0 standard drink     Types: 1 Cans of beer per week   • Drug use: No   • Sexual activity: Yes     Partners: Female       Family History:  Family History   Problem Relation Age of Onset   • Hypertension Paternal Grandfather    • Cancer Paternal Grandfather          from colon cancer    • Cancer Mother    • Stroke Maternal Grandfather          Objective     Physical Exam:   Vital Signs:   Vitals:    23 1047   Height: 185.4 cm (72.99\")     Body mass index is 28.77 kg/m².     Physical Exam  Constitutional:       Appearance: Normal appearance.   HENT:      Head: Normocephalic and atraumatic.      Nose: Nose normal.   Eyes:      Extraocular Movements: Extraocular movements intact.      Conjunctiva/sclera: Conjunctivae normal.      Pupils: Pupils are equal, round, and reactive to light.   Musculoskeletal:         General: Normal range of motion.      Cervical back: Normal range of motion and neck supple.   Skin:     General: Skin is warm and dry.      Findings: No lesion or rash.   Neurological:      General: No focal deficit present.      Mental Status: He is alert and oriented to person, place, and time. Mental status is at baseline.   Psychiatric:    "      Mood and Affect: Mood normal.         Behavior: Behavior normal.         Labs:   No results found for: PSA    Brief Urine Lab Results  (Last result in the past 365 days)      Color   Clarity   Blood   Leuk Est   Nitrite   Protein   CREAT   Urine HCG        04/19/23 1103 Yellow   Clear   Negative   Negative   Negative   Negative                 Urine Culture        3/20/2023    12:57   Urine Culture   Urine Culture No growth          Lab Results   Component Value Date    GLUCOSE 82 02/23/2023    CALCIUM 9.4 02/23/2023     02/23/2023    K 4.5 02/23/2023    CO2 28.0 02/23/2023     02/23/2023    BUN 15 02/23/2023    CREATININE 0.91 02/23/2023    EGFRIFNONA 100 01/18/2021    BCR 16.5 02/23/2023    ANIONGAP 6.0 02/23/2023       Lab Results   Component Value Date    WBC 5.36 02/23/2023    HGB 15.0 02/23/2023    HCT 44.1 02/23/2023    MCV 89.3 02/23/2023     02/23/2023       Images:   US Scrotum & Testicles    Result Date: 4/12/2023  Impression: 1. Bilateral varicoceles. 2. Small bilateral hydroceles. Electronically Signed: Jayesh Sharpe  4/12/2023 11:01 AM EDT  Workstation ID: [id number]      Measures:   Tobacco:   Sheldon Interiano  reports that he has never smoked. He has never used smokeless tobacco.       Assessment / Plan      Assessment:  Mr. Interiano is a 33 y.o. male who presented today with lower urinary tract symptoms.  Primary symptoms include urgency, frequency, urinary hesitancy, bilateral testicular pain, possibly perineal pain.  Symptoms may be related to a chronic pelvic floor dysfunction, chronic pelvic pain syndrome, overactive bladder etc.  We will treat him empirically with Detrol for his storage related symptoms, discussed risks of side effects including dry, dry mouth, constipation.  We will also trial him on doxycycline for the next few weeks, he is somewhat worried about ulcerative colitis and how the antibiotics can affect his intestine.  He will monitor for 1 week and discontinue the  antibiotic if significant symptoms arise.  I will check a mycoplasma and Ureaplasma given his ongoing intermittent urethral discomfort.  His scrotal ultrasound is reassuring and was reviewed today.    Diagnoses and all orders for this visit:    1. Urinary frequency (Primary)  -     POC Urinalysis Dipstick, Automated  -     Mycoplasma / Ureaplasma Culture - Swab, Urine, Clean Catch; Future  -     Discontinue: tolterodine LA (DETROL LA) 2 MG 24 hr capsule; Take 1 capsule by mouth Daily.  Dispense: 30 capsule; Refill: 0  -     tolterodine LA (DETROL LA) 2 MG 24 hr capsule; Take 1 capsule by mouth Daily.  Dispense: 30 capsule; Refill: 1    2. Dysuria  -     Mycoplasma / Ureaplasma Culture - Swab, Urine, Clean Catch; Future    3. Pain in both testicles  -     Mycoplasma / Ureaplasma Culture - Swab, Urine, Clean Catch; Future  -     doxycycline (VIBRAMYCIN) 100 MG capsule; Take 1 capsule by mouth 2 (Two) Times a Day for 20 days.  Dispense: 40 capsule; Refill: 0    4. Chronic prostatitis  -     doxycycline (VIBRAMYCIN) 100 MG capsule; Take 1 capsule by mouth 2 (Two) Times a Day for 20 days.  Dispense: 40 capsule; Refill: 0          Follow Up:   Return in about 6 weeks (around 5/31/2023).    I spent approximately 45 minutes providing clinical care for this patient; including review of patient's chart and provider documentation, face to face time spent with patient in examination room (obtaining history, performing physical exam, discussing diagnosis and management options), placing orders, and completing patient documentation.     Joey Barba MD  [facility] [place]

## 2023-04-27 LAB
M HOMINIS SPEC QL CULT: NEGATIVE
U UREALYTICUM SPEC QL CULT: NEGATIVE

## 2023-05-31 ENCOUNTER — OFFICE VISIT (OUTPATIENT)
Dept: UROLOGY | Facility: CLINIC | Age: 33
End: 2023-05-31

## 2023-05-31 VITALS
WEIGHT: 221 LBS | DIASTOLIC BLOOD PRESSURE: 78 MMHG | HEART RATE: 89 BPM | SYSTOLIC BLOOD PRESSURE: 124 MMHG | OXYGEN SATURATION: 98 % | BODY MASS INDEX: 29.29 KG/M2 | HEIGHT: 73 IN

## 2023-05-31 DIAGNOSIS — N41.1 CHRONIC PROSTATITIS: ICD-10-CM

## 2023-05-31 DIAGNOSIS — R35.0 URINARY FREQUENCY: Primary | ICD-10-CM

## 2023-05-31 DIAGNOSIS — M62.89 PELVIC FLOOR DYSFUNCTION: ICD-10-CM

## 2023-05-31 LAB
BILIRUB BLD-MCNC: NEGATIVE MG/DL
CLARITY, POC: ABNORMAL
COLOR UR: YELLOW
EXPIRATION DATE: ABNORMAL
GLUCOSE UR STRIP-MCNC: NEGATIVE MG/DL
KETONES UR QL: NEGATIVE
LEUKOCYTE EST, POC: NEGATIVE
Lab: ABNORMAL
NITRITE UR-MCNC: NEGATIVE MG/ML
PH UR: 8 [PH] (ref 5–8)
PROT UR STRIP-MCNC: NEGATIVE MG/DL
RBC # UR STRIP: NEGATIVE /UL
SP GR UR: 1.01 (ref 1–1.03)
UROBILINOGEN UR QL: NORMAL

## 2023-05-31 RX ORDER — TOLTERODINE 2 MG/1
2 CAPSULE, EXTENDED RELEASE ORAL DAILY
Qty: 60 CAPSULE | Refills: 1 | Status: SHIPPED | OUTPATIENT
Start: 2023-05-31

## 2023-05-31 NOTE — PROGRESS NOTES
Follow Up Office Visit      Patient Name: Sheldon Interiano  : 1990   MRN: [id number]     Chief Complaint:    Chief Complaint   Patient presents with   • Urinary Frequency   • Dysuria   • Chronic Prostatitis       Referring Provider: No ref. provider found    History of Present Illness: Sheldon Interiano is a 33 y.o. male who presents today for follow up of urinary symptoms.  Last seen in clinic in April.  Patient had a sensation of incomplete bladder emptying, bilateral testicular pain, perineal discomfort.  Was drinking 2 cups of coffee per day, has a history of ulcerative colitis and intermittent constipation.  Scrotal ultrasound was negative in April.  He was prescribed Detrol for overactive bladder type symptoms including urgency, frequency and nocturia.  He was also prescribed doxycycline for the possible empiric treatment of prostatitis.  He states he did not take his doxycycline because he was worried about side effects with his Crohn's/ulcerative colitis, he did take the Detrol and he states this is helped his symptoms.  His IPSS is only 3.  He has minimal urgency and frequency.  Patient's urine is negative today.  He has reduced his caffeine intake.  He has noticed that his constipation when present makes his urinary symptoms worse.  He has been trying to avoid constipation.  Denies side effects associated with Detrol.       Subjective      Review of System: Review of Systems   Genitourinary: Positive for frequency and urgency.      I have reviewed the ROS documented by my clinical staff, I have updated appropriately and I agree. Joey Barba MD    I have reviewed and the following portions of the patient's history were updated as appropriate: past family history, past medical history, past social history, past surgical history and problem list.    Medications:     Current Outpatient Medications:   •  balsalazide (COLAZAL) 750 MG capsule, Take 3 capsules by mouth 3 (Three) Times a Day., Disp:  , Rfl:   •  cetirizine (zyrTEC) 10 MG tablet, Zyrtec 10 mg tablet, Disp: , Rfl:   •  fluticasone (Flonase) 50 MCG/ACT nasal spray, 2 sprays into the nostril(s) as directed by provider Daily., Disp: 5 g, Rfl: 0  •  tolterodine LA (DETROL LA) 2 MG 24 hr capsule, Take 1 capsule by mouth Daily., Disp: 60 capsule, Rfl: 1    Allergies:   No Known Allergies    IPSS Questionnaire (AUA-7):  Over the past month…    1)  Incomplete Emptying:       How often have you had a sensation of not emptying you had the sensation of not emptying your bladder completely after you finished urinating?  1 - Less than 1 time in 5   2)  Frequency:       How often have you had the urinate again less than two hours after you finished urinating?  1 - Less than 1 time in 5   3)  Intermittency:       How often have you found you stopped and started again several times when you urinated?   0 - Not at all   4) Urgency:      How often have you found it difficult to postpone urination?  1 - Less than 1 time in 5   5) Weak Stream:      How often have you had a weak urinary stream?  0 - Not at all   6) Straining:       How often have you had to push or strain to begin urination?  0 - Not at all   7) Nocturia:      How many times did you most typically get up to urinate from the time you went to bed at night until the time you got up in the morning?  0 - None   Total Score:  3   The International Prostate Symptom Score (IPSS) is used to screen, diagnose, track symptoms of benign prostatic hyperplasia (BPH).   0-7 (Mild Symptoms) 8-19 (Moderate) 20-35 (Severe)   Quality of Life (QoL):  If you were to spend the rest of your life with your urinary condition just the way it is now, how would you feel about that? 3-Mixed   Urine Leakage (Incontinence) 0-No Leakage     Sexual Health Inventory for Men (RACHAEL)   Over the past 6 months:     1. How do you rate your confidence that you could get and keep an erection?  5 - Very high    2. When you had erections with  "sexual  stimulation, how often were your erections hard enough for penetration (entering your partner)?  5 - Almost always or always    3. During sexual intercourse, how often were you able to maintain your erection after you had penetrated (entered) your partner?  5 - Almost always or always    4. During sexual intercourse, how difficult was it to maintain your erection to completion of intercourse?  5 - Not difficult    5. When you attempted sexual intercourse, how often was it satisfactory for you?  5 - Almost always or always     Total Score: 25   The Sexual Health Inventory for Men further classifies ED severity with the following breakpoints:   1-7 (Severe ED) 8-11 (Moderate ED) 12-16 (Mild to Moderate ED) 17-21 (Mild ED)      Post void residual bladder scan:   0 mL     Objective     Physical Exam:   Vital Signs:   Vitals:    05/31/23 1112   BP: 124/78   Pulse: 89   SpO2: 98%   Weight: 100 kg (221 lb)   Height: 185.4 cm (72.99\")     Body mass index is 29.17 kg/m².     Physical Exam  Constitutional:       Appearance: Normal appearance.   HENT:      Head: Normocephalic and atraumatic.      Nose: Nose normal.   Eyes:      Extraocular Movements: Extraocular movements intact.      Conjunctiva/sclera: Conjunctivae normal.      Pupils: Pupils are equal, round, and reactive to light.   Musculoskeletal:         General: Normal range of motion.      Cervical back: Normal range of motion and neck supple.   Skin:     General: Skin is warm and dry.      Findings: No lesion or rash.   Neurological:      General: No focal deficit present.      Mental Status: He is alert and oriented to person, place, and time. Mental status is at baseline.   Psychiatric:         Mood and Affect: Mood normal.         Behavior: Behavior normal.         Labs:   Brief Urine Lab Results  (Last result in the past 365 days)      Color   Clarity   Blood   Leuk Est   Nitrite   Protein   CREAT   Urine HCG        05/31/23 1123 Yellow   Cloudy   " Negative   Negative   Negative   Negative                 Urine Culture        3/20/2023    12:57   Urine Culture   Urine Culture No growth          Lab Results   Component Value Date    GLUCOSE 82 02/23/2023    CALCIUM 9.4 02/23/2023     02/23/2023    K 4.5 02/23/2023    CO2 28.0 02/23/2023     02/23/2023    BUN 15 02/23/2023    CREATININE 0.91 02/23/2023    EGFRIFNONA 100 01/18/2021    BCR 16.5 02/23/2023    ANIONGAP 6.0 02/23/2023       Lab Results   Component Value Date    WBC 5.36 02/23/2023    HGB 15.0 02/23/2023    HCT 44.1 02/23/2023    MCV 89.3 02/23/2023     02/23/2023       Images:   US Scrotum & Testicles    Result Date: 4/12/2023  Impression: 1. Bilateral varicoceles. 2. Small bilateral hydroceles. Electronically Signed: Jayesh Sharpe  4/12/2023 11:01 AM EDT  Workstation ID: [id number]      Measures:   Tobacco:   Sheldon Interiano  reports that he has never smoked. He has never used smokeless tobacco.    Assessment / Plan      Assessment/Plan:   33 y.o. male who presented today for follow up of urinary symptoms including OAB and possible prostatitis versus chronic pelvic floor dysfunction.  Other options discussed include referral to pelvic floor therapist to discuss pelvic floor relaxation.  Educational materials was provided.  He would like to continue the Detrol for now.  I will see him back in 6 months.  He has minimal or ongoing symptoms at this time and we discussed if he would like to cancel his follow-up prior to next visit and trial off the Detrol he is welcome to do so.  His urinalysis is clean today.  We do not have any objective evidence of prostatitis at this point.    Diagnoses and all orders for this visit:    1. Urinary frequency (Primary)  -     POC Urinalysis Dipstick, Automated  -     tolterodine LA (DETROL LA) 2 MG 24 hr capsule; Take 1 capsule by mouth Daily.  Dispense: 60 capsule; Refill: 1    2. Pelvic floor dysfunction    3. Chronic prostatitis  -     POC Urinalysis  Dipstick, Automated           Follow Up:   Return in about 6 months (around 11/30/2023).    I spent approximately 20 minutes providing clinical care for this patient; including review of patient's chart and provider documentation, face to face time spent with patient in examination room (obtaining history, performing physical exam, discussing diagnosis and management options), placing orders, and completing patient documentation.     Joey Barba MD  [facility]

## 2023-11-30 ENCOUNTER — OFFICE VISIT (OUTPATIENT)
Dept: UROLOGY | Facility: CLINIC | Age: 33
End: 2023-11-30
Payer: COMMERCIAL

## 2023-11-30 VITALS — BODY MASS INDEX: 29.17 KG/M2 | HEIGHT: 73 IN | HEART RATE: 83 BPM | OXYGEN SATURATION: 98 %

## 2023-11-30 DIAGNOSIS — R35.0 URINARY FREQUENCY: ICD-10-CM

## 2023-11-30 DIAGNOSIS — R30.0 DYSURIA: ICD-10-CM

## 2023-11-30 DIAGNOSIS — M62.89 PELVIC FLOOR DYSFUNCTION: Primary | ICD-10-CM

## 2023-11-30 DIAGNOSIS — N41.1 CHRONIC PROSTATITIS: ICD-10-CM

## 2023-11-30 LAB
BILIRUB BLD-MCNC: NEGATIVE MG/DL
CLARITY, POC: CLEAR
COLOR UR: YELLOW
EXPIRATION DATE: ABNORMAL
GLUCOSE UR STRIP-MCNC: NEGATIVE MG/DL
KETONES UR QL: NEGATIVE
LEUKOCYTE EST, POC: ABNORMAL
Lab: ABNORMAL
NITRITE UR-MCNC: NEGATIVE MG/ML
PH UR: 8 [PH] (ref 5–8)
PROT UR STRIP-MCNC: NEGATIVE MG/DL
RBC # UR STRIP: NEGATIVE /UL
SP GR UR: 1 (ref 1–1.03)
UROBILINOGEN UR QL: NORMAL

## 2023-11-30 NOTE — PROGRESS NOTES
Follow Up Office Visit      Patient Name: Sheldon Interiano  : 1990   MRN: [id number]     Chief Complaint:    Chief Complaint   Patient presents with    Urinary Frequency    Dysuria    Chronic Prostatitis       Referring Provider: No ref. provider found    History of Present Illness: Sheldon Interiano is a 33 y.o. male who presents today for follow up of lower urinary tract symptoms.  He was last seen in May.  The patient has a history of chronic perineal discomfort, bilateral testicular discomfort, sensation of incomplete bladder emptying, urgency and frequency as well as nocturia.  He was treated with Detrol for a time and this significantly improved his symptoms.  He came off the Detrol over the last few months and reports he is doing great.  His IPSS is 4 with a preserved quality of life.  He denies significant incomplete bladder emptying sensation.  He does have some mild postvoid dribbling.  PVR 0 mL.  Urinalysis with trace leukocytes.  He does endorse performing pelvic floor relaxation techniques which significantly improved his symptoms which bolsters the possible diagnosis of a high tone pelvic floor dysfunction.    Subjective      Review of System: Review of Systems   Genitourinary:  Negative for decreased urine volume, difficulty urinating, dysuria, enuresis, flank pain, frequency, hematuria and urgency.      I have reviewed the ROS documented by my clinical staff, I have updated appropriately and I agree. Joey Barba MD    I have reviewed and the following portions of the patient's history were updated as appropriate: past family history, past medical history, past social history, past surgical history and problem list.    Medications:     Current Outpatient Medications:     balsalazide (COLAZAL) 750 MG capsule, Take 3 capsules by mouth 3 (Three) Times a Day., Disp: , Rfl:     cetirizine (zyrTEC) 10 MG tablet, Zyrtec 10 mg tablet, Disp: , Rfl:     fluticasone (Flonase) 50 MCG/ACT nasal  spray, 2 sprays into the nostril(s) as directed by provider Daily., Disp: 5 g, Rfl: 0    Allergies:   No Known Allergies    IPSS Questionnaire (AUA-7):  Over the past month…    1)  Incomplete Emptying:       How often have you had a sensation of not emptying you had the sensation of not emptying your bladder completely after you finished urinating?  1 - Less than 1 time in 5   2)  Frequency:       How often have you had the urinate again less than two hours after you finished urinating?  1 - Less than 1 time in 5   3)  Intermittency:       How often have you found you stopped and started again several times when you urinated?   0 - Not at all   4) Urgency:      How often have you found it difficult to postpone urination?  1 - Less than 1 time in 5   5) Weak Stream:      How often have you had a weak urinary stream?  0 - Not at all   6) Straining:       How often have you had to push or strain to begin urination?  1 - Less than 1 time in 5   7) Nocturia:      How many times did you most typically get up to urinate from the time you went to bed at night until the time you got up in the morning?  0 - None   Total Score:  4   The International Prostate Symptom Score (IPSS) is used to screen, diagnose, track symptoms of benign prostatic hyperplasia (BPH).   0-7 (Mild Symptoms) 8-19 (Moderate) 20-35 (Severe)   Quality of Life (QoL):  If you were to spend the rest of your life with your urinary condition just the way it is now, how would you feel about that? 2-Mostly Satisfied   Urine Leakage (Incontinence) 0-No Leakage     Sexual Health Inventory for Men (RACHAEL)   Over the past 6 months:     1. How do you rate your confidence that you could get and keep an erection?  4 - High    2. When you had erections with sexual  stimulation, how often were your erections hard enough for penetration (entering your partner)?  4 - Most times ( much more than, half the time)   3. During sexual intercourse, how often were you able to  "maintain your erection after you had penetrated (entered) your partner?  4 - Most times ( much more than, half the time)   4. During sexual intercourse, how difficult was it to maintain your erection to completion of intercourse?  4 - Sightly difficult    5. When you attempted sexual intercourse, how often was it satisfactory for you?  5 - Almost always or always     Total Score: 21   The Sexual Health Inventory for Men further classifies ED severity with the following breakpoints:   1-7 (Severe ED) 8-11 (Moderate ED) 12-16 (Mild to Moderate ED) 17-21 (Mild ED)      Post void residual bladder scan:   0 mL     Objective     Physical Exam:   Vital Signs:   Vitals:    11/30/23 0923   Pulse: 83   SpO2: 98%   Height: 185.4 cm (72.99\")   PainSc: 0-No pain     Body mass index is 29.17 kg/m².     Physical Exam  Constitutional:       Appearance: Normal appearance.   HENT:      Head: Normocephalic and atraumatic.      Nose: Nose normal.   Eyes:      Extraocular Movements: Extraocular movements intact.      Conjunctiva/sclera: Conjunctivae normal.      Pupils: Pupils are equal, round, and reactive to light.   Musculoskeletal:         General: Normal range of motion.      Cervical back: Normal range of motion and neck supple.   Skin:     General: Skin is warm and dry.      Findings: No lesion or rash.   Neurological:      General: No focal deficit present.      Mental Status: He is alert and oriented to person, place, and time. Mental status is at baseline.   Psychiatric:         Mood and Affect: Mood normal.         Behavior: Behavior normal.         Labs:   Brief Urine Lab Results  (Last result in the past 365 days)        Color   Clarity   Blood   Leuk Est   Nitrite   Protein   CREAT   Urine HCG        11/30/23 0934 Yellow   Clear   Negative   Trace   Negative   Negative                   Urine Culture          3/20/2023    12:57   Urine Culture   Urine Culture No growth         Lab Results   Component Value Date    GLUCOSE " 82 02/23/2023    CALCIUM 9.4 02/23/2023     02/23/2023    K 4.5 02/23/2023    CO2 28.0 02/23/2023     02/23/2023    BUN 15 02/23/2023    CREATININE 0.91 02/23/2023    EGFRIFNONA 100 01/18/2021    BCR 16.5 02/23/2023    ANIONGAP 6.0 02/23/2023       Lab Results   Component Value Date    WBC 5.36 02/23/2023    HGB 15.0 02/23/2023    HCT 44.1 02/23/2023    MCV 89.3 02/23/2023     02/23/2023       Images:   No Images in the past 120 days found..    Measures:   Tobacco:   Sheldon Interiano  reports that he has never smoked. He has never used smokeless tobacco.      Assessment / Plan      Assessment/Plan:   33 y.o. male who presented today for follow up of lower urinary tract symptoms, possible chronic pelvic floor dysfunction.  Doing fairly well, off Detrol.  Used pelvic floor relaxation techniques with improvement in symptoms.  If symptoms worsen over time we discussed referring him to a pelvic floor therapist for focused teaching.  He can follow-up as needed.  I will send a guidance UTI culture given his smoldering leukocytes present on UA today, we will rule out smoldering infection that may warrant antibiotic treatment.    Diagnoses and all orders for this visit:    1. Pelvic floor dysfunction (Primary)    2. Urinary frequency  -     POC Urinalysis Dipstick, Automated         Follow Up:   Return if symptoms worsen or fail to improve.    I spent approximately 20 minutes providing clinical care for this patient; including review of patient's chart and provider documentation, face to face time spent with patient in examination room (obtaining history, performing physical exam, discussing diagnosis and management options), placing orders, and completing patient documentation.     Joey Barba MD  [facility] [place]

## 2023-12-04 DIAGNOSIS — N30.00 ACUTE CYSTITIS WITHOUT HEMATURIA: Primary | ICD-10-CM

## 2023-12-04 RX ORDER — NITROFURANTOIN 25; 75 MG/1; MG/1
100 CAPSULE ORAL 2 TIMES DAILY
Qty: 10 CAPSULE | Refills: 0 | Status: SHIPPED | OUTPATIENT
Start: 2023-12-04

## 2023-12-04 NOTE — PROGRESS NOTES
Patient's guidance urine culture is positive for Enterococcus.  I sent him a message via voicemail, unable to reach him that I will send him a 5-day course of Macrobid 100 mg twice daily to clear his urine.  This is based on susceptibilities.    oJey Barba MD

## 2024-02-02 ENCOUNTER — OFFICE VISIT (OUTPATIENT)
Dept: FAMILY MEDICINE CLINIC | Facility: CLINIC | Age: 34
End: 2024-02-02
Payer: COMMERCIAL

## 2024-02-02 VITALS
HEIGHT: 73 IN | DIASTOLIC BLOOD PRESSURE: 72 MMHG | OXYGEN SATURATION: 98 % | BODY MASS INDEX: 29.95 KG/M2 | WEIGHT: 226 LBS | SYSTOLIC BLOOD PRESSURE: 132 MMHG | HEART RATE: 82 BPM

## 2024-02-02 DIAGNOSIS — Z00.00 PREVENTATIVE HEALTH CARE: Primary | ICD-10-CM

## 2024-02-02 DIAGNOSIS — R10.2 PELVIC PAIN: ICD-10-CM

## 2024-02-02 PROCEDURE — 99395 PREV VISIT EST AGE 18-39: CPT | Performed by: INTERNAL MEDICINE

## 2024-02-02 NOTE — PROGRESS NOTES
Chief Complaint   Patient presents with    Annual Exam       HPI:  Sheldon Interiano is a 33 y.o. male who presents today for annual exam.  No acute concerns today.    ROS:  Constitutional: no fevers, night sweats or unexplained weight loss  Eyes: no vision changes  ENT: no runny nose, ear pain, sore throat  Cardio: no chest pain, palpitations  Pulm: no shortness of breath, wheezing, or cough  GI: no abdominal pain or changes in bowel movements  : no difficulty urinating  MSK: no difficulty ambulating, no joint pain  Neuro: no weakness, dizziness or headache  Psych: no trouble sleeping  Endo: no change in appetite      Past Medical History:   Diagnosis Date    Allergic     Seasonal    Colitis     GERD (gastroesophageal reflux disease)     Inflammatory bowel disease     Have gastro doctor I see. Inactive currently    Vitiligo       Family History   Problem Relation Age of Onset    Hypertension Paternal Grandfather     Cancer Paternal Grandfather          from colon cancer     Cancer Mother     Stroke Maternal Grandfather       Social History     Socioeconomic History    Marital status:    Tobacco Use    Smoking status: Never    Smokeless tobacco: Never   Vaping Use    Vaping Use: Never used   Substance and Sexual Activity    Alcohol use: Yes     Alcohol/week: 1.0 standard drink of alcohol     Types: 1 Cans of beer per week    Drug use: No    Sexual activity: Yes     Partners: Female      No Known Allergies   Immunization History   Administered Date(s) Administered    COVID-19 (PFIZER) BIVALENT 12+YRS 2022    COVID-19 (PFIZER) Purple Cap Monovalent 2021, 2021, 2021    Flu Vaccine Quad PF 6-35MO 2022    Flublok 18+yrs 2021    Fluzone (or Fluarix & Flulaval for VFC) >6mos 10/16/2018, 10/25/2019, 2020    Influenza, Unspecified 2018, 10/16/2018, 2020, 2022    Tdap 10/16/2018        PE:  Vitals:    24 0810   BP: 132/72   Pulse: 82   SpO2:  98%      Body mass index is 29.83 kg/m².    Gen Appearance: NAD  HEENT: Normocephalic, PERRLA, no thyromegaly, trache midline  Heart: RRR, normal S1 and S2, no murmur  Lungs: CTA b/l, no wheezing, no crackles  Abdomen: Soft, non-tender, non-distended, no guarding and BSx4  MSK: Moves all extremities well, normal gait, no peripheral edema  Pulses: Palpable and equal b/l  Lymph nodes: No palpable lymphadenopathy   Neuro: No focal deficits      Current Outpatient Medications   Medication Sig Dispense Refill    balsalazide (COLAZAL) 750 MG capsule Take 3 capsules by mouth 3 (Three) Times a Day.      cetirizine (zyrTEC) 10 MG tablet Zyrtec 10 mg tablet      fluticasone (Flonase) 50 MCG/ACT nasal spray 2 sprays into the nostril(s) as directed by provider Daily. 5 g 0     No current facility-administered medications for this visit.        Diagnoses and all orders for this visit:    1. Preventative health care (Primary)  -     CBC & Differential; Future  -     Comprehensive Metabolic Panel; Future  -     Hemoglobin A1c; Future  -     Lipid Panel; Future  -     TSH+Free T4; Future  -     Urinalysis With Culture If Indicated - Urine, Clean Catch; Future  -     Vitamin D,25-Hydroxy; Future  Counseled on healthy weight, nutrition, physical activity, cancer screening, and immunizations.    2. Pelvic pain  Following with urology, symptoms improved after Macrobid.       Return in about 1 year (around 2/2/2025) for Annual.     Dictated Utilizing Dragon Dictation    Please note that portions of this note were completed with a voice recognition program.    Part of this note may be an electronic transcription/translation of spoken language to printed text using the Dragon Dictation System.

## 2024-02-16 ENCOUNTER — LAB (OUTPATIENT)
Dept: LAB | Facility: HOSPITAL | Age: 34
End: 2024-02-16
Payer: COMMERCIAL

## 2024-02-16 DIAGNOSIS — Z00.00 PREVENTATIVE HEALTH CARE: ICD-10-CM

## 2024-02-16 LAB
25(OH)D3 SERPL-MCNC: 25.6 NG/ML (ref 30–100)
ALBUMIN SERPL-MCNC: 4.5 G/DL (ref 3.5–5.2)
ALBUMIN/GLOB SERPL: 1.9 G/DL
ALP SERPL-CCNC: 78 U/L (ref 39–117)
ALT SERPL W P-5'-P-CCNC: 18 U/L (ref 1–41)
ANION GAP SERPL CALCULATED.3IONS-SCNC: 7 MMOL/L (ref 5–15)
AST SERPL-CCNC: 25 U/L (ref 1–40)
BASOPHILS # BLD AUTO: 0.05 10*3/MM3 (ref 0–0.2)
BASOPHILS NFR BLD AUTO: 0.8 % (ref 0–1.5)
BILIRUB SERPL-MCNC: 0.3 MG/DL (ref 0–1.2)
BILIRUB UR QL STRIP: NEGATIVE
BUN SERPL-MCNC: 9 MG/DL (ref 6–20)
BUN/CREAT SERPL: 10 (ref 7–25)
CALCIUM SPEC-SCNC: 9.1 MG/DL (ref 8.6–10.5)
CHLORIDE SERPL-SCNC: 107 MMOL/L (ref 98–107)
CHOLEST SERPL-MCNC: 163 MG/DL (ref 0–200)
CLARITY UR: CLEAR
CO2 SERPL-SCNC: 27 MMOL/L (ref 22–29)
COLOR UR: YELLOW
CREAT SERPL-MCNC: 0.9 MG/DL (ref 0.76–1.27)
DEPRECATED RDW RBC AUTO: 38.4 FL (ref 37–54)
EGFRCR SERPLBLD CKD-EPI 2021: 114.2 ML/MIN/1.73
EOSINOPHIL # BLD AUTO: 0.1 10*3/MM3 (ref 0–0.4)
EOSINOPHIL NFR BLD AUTO: 1.7 % (ref 0.3–6.2)
ERYTHROCYTE [DISTWIDTH] IN BLOOD BY AUTOMATED COUNT: 12.2 % (ref 12.3–15.4)
GLOBULIN UR ELPH-MCNC: 2.4 GM/DL
GLUCOSE SERPL-MCNC: 94 MG/DL (ref 65–99)
GLUCOSE UR STRIP-MCNC: NEGATIVE MG/DL
HBA1C MFR BLD: 5.4 % (ref 4.8–5.6)
HCT VFR BLD AUTO: 44.7 % (ref 37.5–51)
HDLC SERPL-MCNC: 52 MG/DL (ref 40–60)
HGB BLD-MCNC: 15.1 G/DL (ref 13–17.7)
HGB UR QL STRIP.AUTO: NEGATIVE
HOLD SPECIMEN: NORMAL
IMM GRANULOCYTES # BLD AUTO: 0.01 10*3/MM3 (ref 0–0.05)
IMM GRANULOCYTES NFR BLD AUTO: 0.2 % (ref 0–0.5)
KETONES UR QL STRIP: NEGATIVE
LDLC SERPL CALC-MCNC: 101 MG/DL (ref 0–100)
LDLC/HDLC SERPL: 1.95 {RATIO}
LEUKOCYTE ESTERASE UR QL STRIP.AUTO: NEGATIVE
LYMPHOCYTES # BLD AUTO: 1.4 10*3/MM3 (ref 0.7–3.1)
LYMPHOCYTES NFR BLD AUTO: 23.6 % (ref 19.6–45.3)
MCH RBC QN AUTO: 30.1 PG (ref 26.6–33)
MCHC RBC AUTO-ENTMCNC: 33.8 G/DL (ref 31.5–35.7)
MCV RBC AUTO: 89 FL (ref 79–97)
MONOCYTES # BLD AUTO: 0.35 10*3/MM3 (ref 0.1–0.9)
MONOCYTES NFR BLD AUTO: 5.9 % (ref 5–12)
NEUTROPHILS NFR BLD AUTO: 4.03 10*3/MM3 (ref 1.7–7)
NEUTROPHILS NFR BLD AUTO: 67.8 % (ref 42.7–76)
NITRITE UR QL STRIP: NEGATIVE
NRBC BLD AUTO-RTO: 0 /100 WBC (ref 0–0.2)
PH UR STRIP.AUTO: 8 [PH] (ref 5–8)
PLATELET # BLD AUTO: 271 10*3/MM3 (ref 140–450)
PMV BLD AUTO: 10 FL (ref 6–12)
POTASSIUM SERPL-SCNC: 4.8 MMOL/L (ref 3.5–5.2)
PROT SERPL-MCNC: 6.9 G/DL (ref 6–8.5)
PROT UR QL STRIP: NEGATIVE
RBC # BLD AUTO: 5.02 10*6/MM3 (ref 4.14–5.8)
SODIUM SERPL-SCNC: 141 MMOL/L (ref 136–145)
SP GR UR STRIP: 1.02 (ref 1–1.03)
T4 FREE SERPL-MCNC: 1.21 NG/DL (ref 0.93–1.7)
TRIGL SERPL-MCNC: 47 MG/DL (ref 0–150)
TSH SERPL DL<=0.05 MIU/L-ACNC: 0.93 UIU/ML (ref 0.27–4.2)
UROBILINOGEN UR QL STRIP: NORMAL
VLDLC SERPL-MCNC: 10 MG/DL (ref 5–40)
WBC NRBC COR # BLD AUTO: 5.94 10*3/MM3 (ref 3.4–10.8)

## 2024-02-16 PROCEDURE — 80061 LIPID PANEL: CPT

## 2024-02-16 PROCEDURE — 84439 ASSAY OF FREE THYROXINE: CPT

## 2024-02-16 PROCEDURE — 81003 URINALYSIS AUTO W/O SCOPE: CPT

## 2024-02-16 PROCEDURE — 82306 VITAMIN D 25 HYDROXY: CPT

## 2024-02-16 PROCEDURE — 84443 ASSAY THYROID STIM HORMONE: CPT

## 2024-02-16 PROCEDURE — 85025 COMPLETE CBC W/AUTO DIFF WBC: CPT

## 2024-02-16 PROCEDURE — 80053 COMPREHEN METABOLIC PANEL: CPT

## 2024-02-16 PROCEDURE — 83036 HEMOGLOBIN GLYCOSYLATED A1C: CPT

## 2025-02-03 ENCOUNTER — LAB (OUTPATIENT)
Dept: LAB | Facility: HOSPITAL | Age: 35
End: 2025-02-03
Payer: COMMERCIAL

## 2025-02-03 ENCOUNTER — OFFICE VISIT (OUTPATIENT)
Dept: FAMILY MEDICINE CLINIC | Facility: CLINIC | Age: 35
End: 2025-02-03
Payer: COMMERCIAL

## 2025-02-03 VITALS
HEIGHT: 73 IN | OXYGEN SATURATION: 97 % | DIASTOLIC BLOOD PRESSURE: 82 MMHG | WEIGHT: 224.8 LBS | SYSTOLIC BLOOD PRESSURE: 126 MMHG | HEART RATE: 71 BPM | BODY MASS INDEX: 29.79 KG/M2

## 2025-02-03 DIAGNOSIS — K51.919 ULCERATIVE COLITIS WITH COMPLICATION, UNSPECIFIED LOCATION: ICD-10-CM

## 2025-02-03 DIAGNOSIS — Z00.00 PREVENTATIVE HEALTH CARE: Primary | ICD-10-CM

## 2025-02-03 DIAGNOSIS — J30.2 SEASONAL ALLERGIES: ICD-10-CM

## 2025-02-03 DIAGNOSIS — Z00.00 PREVENTATIVE HEALTH CARE: ICD-10-CM

## 2025-02-03 LAB
BILIRUB UR QL STRIP: NEGATIVE
CLARITY UR: CLEAR
COLOR UR: ABNORMAL
GLUCOSE UR STRIP-MCNC: NEGATIVE MG/DL
HBA1C MFR BLD: 5.3 % (ref 4.8–5.6)
HGB UR QL STRIP.AUTO: NEGATIVE
HOLD SPECIMEN: NORMAL
KETONES UR QL STRIP: NEGATIVE
LEUKOCYTE ESTERASE UR QL STRIP.AUTO: NEGATIVE
NITRITE UR QL STRIP: NEGATIVE
PH UR STRIP.AUTO: 6 [PH] (ref 5–8)
PROT UR QL STRIP: NEGATIVE
SP GR UR STRIP: 1.03 (ref 1–1.03)
UROBILINOGEN UR QL STRIP: ABNORMAL

## 2025-02-03 PROCEDURE — 83036 HEMOGLOBIN GLYCOSYLATED A1C: CPT

## 2025-02-03 PROCEDURE — 84402 ASSAY OF FREE TESTOSTERONE: CPT

## 2025-02-03 PROCEDURE — 82607 VITAMIN B-12: CPT

## 2025-02-03 PROCEDURE — 83540 ASSAY OF IRON: CPT

## 2025-02-03 PROCEDURE — 82746 ASSAY OF FOLIC ACID SERUM: CPT

## 2025-02-03 PROCEDURE — 81003 URINALYSIS AUTO W/O SCOPE: CPT

## 2025-02-03 PROCEDURE — 84443 ASSAY THYROID STIM HORMONE: CPT

## 2025-02-03 PROCEDURE — 84403 ASSAY OF TOTAL TESTOSTERONE: CPT

## 2025-02-03 PROCEDURE — 80053 COMPREHEN METABOLIC PANEL: CPT

## 2025-02-03 PROCEDURE — 85025 COMPLETE CBC W/AUTO DIFF WBC: CPT

## 2025-02-03 PROCEDURE — 84439 ASSAY OF FREE THYROXINE: CPT

## 2025-02-03 PROCEDURE — 80061 LIPID PANEL: CPT

## 2025-02-03 PROCEDURE — 99395 PREV VISIT EST AGE 18-39: CPT | Performed by: INTERNAL MEDICINE

## 2025-02-03 PROCEDURE — 82306 VITAMIN D 25 HYDROXY: CPT

## 2025-02-03 PROCEDURE — 84466 ASSAY OF TRANSFERRIN: CPT

## 2025-02-03 RX ORDER — ESOMEPRAZOLE MAGNESIUM 40 MG/1
20 CAPSULE, DELAYED RELEASE ORAL DAILY
COMMUNITY

## 2025-02-03 RX ORDER — VEDOLIZUMAB 108 MG/.68ML
108 INJECTION, SOLUTION SUBCUTANEOUS
COMMUNITY
Start: 2025-01-28

## 2025-02-03 NOTE — PROGRESS NOTES
Chief Complaint   Patient presents with    Annual Exam       HPI:  Sheldon Interiano is a 34 y.o. male who presents today for annual exam.    ROS:  Constitutional: no fevers, night sweats or unexplained weight loss  Eyes: no vision changes  ENT: no runny nose, ear pain, sore throat  Cardio: no chest pain, palpitations  Pulm: no shortness of breath, wheezing, or cough  GI: no abdominal pain or changes in bowel movements  : no difficulty urinating  MSK: no difficulty ambulating, no joint pain  Neuro: no weakness, dizziness or headache  Psych: no trouble sleeping  Endo: no change in appetite      Past Medical History:   Diagnosis Date    Allergic     Seasonal    Colitis     GERD (gastroesophageal reflux disease)     Inflammatory bowel disease     Have gastro doctor I see. Inactive currently    Vitiligo       Family History   Problem Relation Age of Onset    Hypertension Paternal Grandfather     Cancer Paternal Grandfather          from colon cancer     Cancer Mother     Stroke Maternal Grandfather       Social History     Socioeconomic History    Marital status:    Tobacco Use    Smoking status: Never    Smokeless tobacco: Never   Vaping Use    Vaping status: Never Used   Substance and Sexual Activity    Alcohol use: Yes     Alcohol/week: 1.0 standard drink of alcohol     Types: 1 Cans of beer per week    Drug use: No    Sexual activity: Yes     Partners: Female      No Known Allergies   Immunization History   Administered Date(s) Administered    COVID-19 (PFIZER) 12YRS+ (COMIRNATY) 10/19/2024    COVID-19 (PFIZER) BIVALENT 12+YRS 2022    COVID-19 (PFIZER) Purple Cap Monovalent 2021, 2021, 2021    Flu Vaccine Quad PF 6-35MO 2022    Flublok 18+yrs 2021    Fluzone (or Fluarix & Flulaval for VFC) >6mos 10/16/2018, 10/25/2019, 2020    Influenza, Unspecified 2018, 10/16/2018, 2020, 2022    Tdap 10/16/2018        PE:  Vitals:    25 0810   BP:  126/82   Pulse: 71   SpO2: 97%      Body mass index is 29.67 kg/m².    Gen Appearance: NAD  HEENT: Normocephalic, PERRLA, no thyromegaly, trache midline  Heart: RRR, normal S1 and S2, no murmur  Lungs: CTA b/l, no wheezing, no crackles  Abdomen: Soft, non-tender, non-distended, no guarding and BSx4  MSK: Moves all extremities well, normal gait, no peripheral edema  Pulses: Palpable and equal b/l  Lymph nodes: No palpable lymphadenopathy   Neuro: No focal deficits      Current Outpatient Medications   Medication Sig Dispense Refill    balsalazide (COLAZAL) 750 MG capsule Take 3 capsules by mouth 3 (Three) Times a Day.      cetirizine (zyrTEC) 10 MG tablet Zyrtec 10 mg tablet      esomeprazole (nexIUM) 40 MG capsule Take 20 mg by mouth Daily. (Patient taking differently: Take 20 mg by mouth As Needed.)      fluticasone (Flonase) 50 MCG/ACT nasal spray 2 sprays into the nostril(s) as directed by provider Daily. 5 g 0    Vedolizumab (Entyvio Pen) 108 MG/0.68ML solution auto-injector Inject 0.68 mL under the skin into the appropriate area as directed. (Patient taking differently: Inject 0.68 mL under the skin into the appropriate area as directed. Patient has not picked up from pharmacy, but will be taking it.)       No current facility-administered medications for this visit.        Diagnoses and all orders for this visit:    1. Preventative health care (Primary)  -     CBC & Differential; Future  -     Comprehensive Metabolic Panel; Future  -     Lipid Panel; Future  -     Hemoglobin A1c; Future  -     TSH+Free T4; Future  -     Urinalysis With Culture If Indicated - Urine, Clean Catch; Future  -     Vitamin D,25-Hydroxy; Future  -     Testosterone, Free, Total; Future  Counseled on healthy weight, nutrition, physical activity, cancer screening, and immunizations.    2. Ulcerative colitis with complication, unspecified location  -     Iron Profile; Future  -     Vitamin B12; Future  -     Folate; Future  -      Testosterone, Free, Total; Future    3. Seasonal allergies         Return in about 1 year (around 2/3/2026) for Annual physical.     Dictated Utilizing Dragon Dictation    Please note that portions of this note were completed with a voice recognition program.    Part of this note may be an electronic transcription/translation of spoken language to printed text using the Dragon Dictation System.

## 2025-02-04 LAB
25(OH)D3 SERPL-MCNC: 31.4 NG/ML (ref 30–100)
ALBUMIN SERPL-MCNC: 4.3 G/DL (ref 3.5–5.2)
ALBUMIN/GLOB SERPL: 1.3 G/DL
ALP SERPL-CCNC: 89 U/L (ref 39–117)
ALT SERPL W P-5'-P-CCNC: 23 U/L (ref 1–41)
ANION GAP SERPL CALCULATED.3IONS-SCNC: 10.5 MMOL/L (ref 5–15)
AST SERPL-CCNC: 33 U/L (ref 1–40)
BASOPHILS # BLD AUTO: 0.07 10*3/MM3 (ref 0–0.2)
BASOPHILS NFR BLD AUTO: 1.1 % (ref 0–1.5)
BILIRUB SERPL-MCNC: 0.4 MG/DL (ref 0–1.2)
BUN SERPL-MCNC: 11 MG/DL (ref 6–20)
BUN/CREAT SERPL: 10.6 (ref 7–25)
CALCIUM SPEC-SCNC: 9.1 MG/DL (ref 8.6–10.5)
CHLORIDE SERPL-SCNC: 105 MMOL/L (ref 98–107)
CHOLEST SERPL-MCNC: 161 MG/DL (ref 0–200)
CO2 SERPL-SCNC: 28.5 MMOL/L (ref 22–29)
CREAT SERPL-MCNC: 1.04 MG/DL (ref 0.76–1.27)
DEPRECATED RDW RBC AUTO: 38 FL (ref 37–54)
EGFRCR SERPLBLD CKD-EPI 2021: 95.4 ML/MIN/1.73
EOSINOPHIL # BLD AUTO: 0.19 10*3/MM3 (ref 0–0.4)
EOSINOPHIL NFR BLD AUTO: 3 % (ref 0.3–6.2)
ERYTHROCYTE [DISTWIDTH] IN BLOOD BY AUTOMATED COUNT: 11.7 % (ref 12.3–15.4)
FOLATE SERPL-MCNC: 17 NG/ML (ref 4.78–24.2)
GLOBULIN UR ELPH-MCNC: 3.3 GM/DL
GLUCOSE SERPL-MCNC: 68 MG/DL (ref 65–99)
HCT VFR BLD AUTO: 46.8 % (ref 37.5–51)
HDLC SERPL-MCNC: 49 MG/DL (ref 40–60)
HGB BLD-MCNC: 15.8 G/DL (ref 13–17.7)
IMM GRANULOCYTES # BLD AUTO: 0.01 10*3/MM3 (ref 0–0.05)
IMM GRANULOCYTES NFR BLD AUTO: 0.2 % (ref 0–0.5)
IRON 24H UR-MRATE: 155 MCG/DL (ref 59–158)
IRON SATN MFR SERPL: 44 % (ref 20–50)
LDLC SERPL CALC-MCNC: 99 MG/DL (ref 0–100)
LDLC/HDLC SERPL: 2.02 {RATIO}
LYMPHOCYTES # BLD AUTO: 1.59 10*3/MM3 (ref 0.7–3.1)
LYMPHOCYTES NFR BLD AUTO: 25.4 % (ref 19.6–45.3)
MCH RBC QN AUTO: 30.4 PG (ref 26.6–33)
MCHC RBC AUTO-ENTMCNC: 33.8 G/DL (ref 31.5–35.7)
MCV RBC AUTO: 90.2 FL (ref 79–97)
MONOCYTES # BLD AUTO: 0.43 10*3/MM3 (ref 0.1–0.9)
MONOCYTES NFR BLD AUTO: 6.9 % (ref 5–12)
NEUTROPHILS NFR BLD AUTO: 3.96 10*3/MM3 (ref 1.7–7)
NEUTROPHILS NFR BLD AUTO: 63.4 % (ref 42.7–76)
NRBC BLD AUTO-RTO: 0 /100 WBC (ref 0–0.2)
PLATELET # BLD AUTO: 273 10*3/MM3 (ref 140–450)
PMV BLD AUTO: 10.3 FL (ref 6–12)
POTASSIUM SERPL-SCNC: 4.1 MMOL/L (ref 3.5–5.2)
PROT SERPL-MCNC: 7.6 G/DL (ref 6–8.5)
RBC # BLD AUTO: 5.19 10*6/MM3 (ref 4.14–5.8)
SODIUM SERPL-SCNC: 144 MMOL/L (ref 136–145)
T4 FREE SERPL-MCNC: 1.21 NG/DL (ref 0.92–1.68)
TIBC SERPL-MCNC: 352 MCG/DL (ref 298–536)
TRANSFERRIN SERPL-MCNC: 236 MG/DL (ref 200–360)
TRIGL SERPL-MCNC: 65 MG/DL (ref 0–150)
TSH SERPL DL<=0.05 MIU/L-ACNC: 0.98 UIU/ML (ref 0.27–4.2)
VIT B12 BLD-MCNC: 621 PG/ML (ref 211–946)
VLDLC SERPL-MCNC: 13 MG/DL (ref 5–40)
WBC NRBC COR # BLD AUTO: 6.25 10*3/MM3 (ref 3.4–10.8)

## 2025-02-06 LAB
TESTOST FREE SERPL-MCNC: 15 PG/ML (ref 8.7–25.1)
TESTOST SERPL-MCNC: 931 NG/DL (ref 264–916)